# Patient Record
Sex: MALE | Race: WHITE | NOT HISPANIC OR LATINO | Employment: FULL TIME | URBAN - METROPOLITAN AREA
[De-identification: names, ages, dates, MRNs, and addresses within clinical notes are randomized per-mention and may not be internally consistent; named-entity substitution may affect disease eponyms.]

---

## 2023-08-24 PROBLEM — E11.65 HYPERGLYCEMIA DUE TO TYPE 2 DIABETES MELLITUS (MULTI): Status: ACTIVE | Noted: 2023-08-24

## 2023-08-24 PROBLEM — N52.9 INABILITY TO ATTAIN ERECTION: Status: ACTIVE | Noted: 2023-08-24

## 2023-08-24 PROBLEM — M67.02 ACQUIRED SHORT ACHILLES TENDON OF LEFT LOWER EXTREMITY: Status: ACTIVE | Noted: 2023-08-24

## 2023-08-24 PROBLEM — E11.9 DIABETES MELLITUS (MULTI): Status: ACTIVE | Noted: 2023-08-24

## 2023-08-24 PROBLEM — M72.2 PLANTAR FASCIITIS: Status: ACTIVE | Noted: 2023-08-24

## 2023-08-24 PROBLEM — H00.031 CELLULITIS OF RIGHT UPPER EYELID: Status: ACTIVE | Noted: 2023-08-24

## 2023-08-24 PROBLEM — L03.213 PRESEPTAL CELLULITIS OF RIGHT UPPER EYELID: Status: ACTIVE | Noted: 2023-08-24

## 2023-08-24 PROBLEM — R03.0 ELEVATED BLOOD PRESSURE READING WITHOUT DIAGNOSIS OF HYPERTENSION: Status: ACTIVE | Noted: 2023-08-24

## 2023-08-24 PROBLEM — N52.1 ERECTILE DYSFUNCTION DUE TO DISEASES CLASSIFIED ELSEWHERE: Status: ACTIVE | Noted: 2023-08-24

## 2023-08-24 PROBLEM — E66.9 OBESITY: Status: ACTIVE | Noted: 2023-08-24

## 2023-08-24 PROBLEM — E78.2 MIXED HYPERLIPIDEMIA: Status: ACTIVE | Noted: 2023-08-24

## 2023-08-24 PROBLEM — E55.9 VITAMIN D DEFICIENCY: Status: ACTIVE | Noted: 2023-08-24

## 2023-08-24 RX ORDER — INSULIN ASPART 100 [IU]/ML
2-6 INJECTION, SOLUTION INTRAVENOUS; SUBCUTANEOUS 3 TIMES DAILY
COMMUNITY
Start: 2019-02-13 | End: 2023-10-20 | Stop reason: SDUPTHER

## 2023-08-24 RX ORDER — CHOLECALCIFEROL (VITAMIN D3) 50 MCG
2000 TABLET ORAL DAILY
COMMUNITY
End: 2023-10-20 | Stop reason: SDUPTHER

## 2023-08-24 RX ORDER — METFORMIN HYDROCHLORIDE 1000 MG/1
1000 TABLET ORAL
COMMUNITY
Start: 2022-04-01 | End: 2023-10-20 | Stop reason: SDUPTHER

## 2023-08-24 RX ORDER — INSULIN DEGLUDEC 100 U/ML
34 INJECTION, SOLUTION SUBCUTANEOUS DAILY
COMMUNITY
End: 2023-10-20 | Stop reason: SDUPTHER

## 2023-08-24 RX ORDER — CLOTRIMAZOLE AND BETAMETHASONE DIPROPIONATE 10; .64 MG/G; MG/G
1 CREAM TOPICAL 2 TIMES DAILY
COMMUNITY
Start: 2013-03-21 | End: 2023-10-20 | Stop reason: SDUPTHER

## 2023-08-24 RX ORDER — INSULIN DEGLUDEC 200 U/ML
34 INJECTION, SOLUTION SUBCUTANEOUS EVERY MORNING
COMMUNITY
Start: 2019-02-11 | End: 2023-10-20 | Stop reason: SDUPTHER

## 2023-10-20 ENCOUNTER — OFFICE VISIT (OUTPATIENT)
Dept: PRIMARY CARE | Facility: CLINIC | Age: 58
End: 2023-10-20
Payer: COMMERCIAL

## 2023-10-20 VITALS
SYSTOLIC BLOOD PRESSURE: 138 MMHG | TEMPERATURE: 98 F | BODY MASS INDEX: 31.75 KG/M2 | OXYGEN SATURATION: 98 % | WEIGHT: 215 LBS | HEART RATE: 73 BPM | DIASTOLIC BLOOD PRESSURE: 80 MMHG

## 2023-10-20 DIAGNOSIS — Z00.00 ANNUAL PHYSICAL EXAM: Primary | ICD-10-CM

## 2023-10-20 DIAGNOSIS — Z01.89 ENCOUNTER FOR ROUTINE LABORATORY TESTING: ICD-10-CM

## 2023-10-20 DIAGNOSIS — Z12.5 ENCOUNTER FOR PROSTATE CANCER SCREENING: ICD-10-CM

## 2023-10-20 DIAGNOSIS — E78.2 MIXED HYPERLIPIDEMIA: ICD-10-CM

## 2023-10-20 DIAGNOSIS — Z23 ENCOUNTER FOR IMMUNIZATION: ICD-10-CM

## 2023-10-20 DIAGNOSIS — Z12.12 ENCOUNTER FOR COLORECTAL CANCER SCREENING: ICD-10-CM

## 2023-10-20 DIAGNOSIS — Z79.4 TYPE 2 DIABETES MELLITUS WITH OTHER SPECIFIED COMPLICATION, WITH LONG-TERM CURRENT USE OF INSULIN (MULTI): ICD-10-CM

## 2023-10-20 DIAGNOSIS — E11.69 TYPE 2 DIABETES MELLITUS WITH OTHER SPECIFIED COMPLICATION, WITH LONG-TERM CURRENT USE OF INSULIN (MULTI): ICD-10-CM

## 2023-10-20 DIAGNOSIS — E55.9 VITAMIN D DEFICIENCY: ICD-10-CM

## 2023-10-20 DIAGNOSIS — Z12.11 ENCOUNTER FOR COLORECTAL CANCER SCREENING: ICD-10-CM

## 2023-10-20 DIAGNOSIS — M15.9 PRIMARY OSTEOARTHRITIS INVOLVING MULTIPLE JOINTS: ICD-10-CM

## 2023-10-20 PROBLEM — M15.0 PRIMARY OSTEOARTHRITIS INVOLVING MULTIPLE JOINTS: Status: ACTIVE | Noted: 2023-10-20

## 2023-10-20 PROBLEM — E11.65 HYPERGLYCEMIA DUE TO TYPE 2 DIABETES MELLITUS (MULTI): Status: RESOLVED | Noted: 2023-08-24 | Resolved: 2023-10-20

## 2023-10-20 PROBLEM — L03.213 PRESEPTAL CELLULITIS OF RIGHT UPPER EYELID: Status: RESOLVED | Noted: 2023-08-24 | Resolved: 2023-10-20

## 2023-10-20 PROCEDURE — 3075F SYST BP GE 130 - 139MM HG: CPT | Performed by: STUDENT IN AN ORGANIZED HEALTH CARE EDUCATION/TRAINING PROGRAM

## 2023-10-20 PROCEDURE — 99396 PREV VISIT EST AGE 40-64: CPT | Performed by: STUDENT IN AN ORGANIZED HEALTH CARE EDUCATION/TRAINING PROGRAM

## 2023-10-20 PROCEDURE — 1036F TOBACCO NON-USER: CPT | Performed by: STUDENT IN AN ORGANIZED HEALTH CARE EDUCATION/TRAINING PROGRAM

## 2023-10-20 PROCEDURE — 99213 OFFICE O/P EST LOW 20 MIN: CPT | Performed by: STUDENT IN AN ORGANIZED HEALTH CARE EDUCATION/TRAINING PROGRAM

## 2023-10-20 PROCEDURE — 3079F DIAST BP 80-89 MM HG: CPT | Performed by: STUDENT IN AN ORGANIZED HEALTH CARE EDUCATION/TRAINING PROGRAM

## 2023-10-20 RX ORDER — CHOLECALCIFEROL (VITAMIN D3) 50 MCG
2000 TABLET ORAL DAILY
Start: 2023-10-20

## 2023-10-20 RX ORDER — INSULIN ASPART 100 [IU]/ML
2-6 INJECTION, SOLUTION INTRAVENOUS; SUBCUTANEOUS
Qty: 10 ML
Start: 2023-10-20 | End: 2024-05-28 | Stop reason: SDUPTHER

## 2023-10-20 RX ORDER — METFORMIN HYDROCHLORIDE 1000 MG/1
1000 TABLET ORAL
Start: 2023-10-20 | End: 2023-11-20

## 2023-10-20 RX ORDER — INSULIN DEGLUDEC 100 U/ML
34 INJECTION, SOLUTION SUBCUTANEOUS DAILY
Start: 2023-10-20 | End: 2024-05-28 | Stop reason: SDUPTHER

## 2023-10-20 RX ORDER — MULTIVITAMIN
TABLET ORAL
Start: 2023-10-20

## 2023-10-20 RX ORDER — CLOTRIMAZOLE AND BETAMETHASONE DIPROPIONATE 10; .64 MG/G; MG/G
1 CREAM TOPICAL DAILY PRN
Qty: 45 G | Refills: 3 | Status: SHIPPED | OUTPATIENT
Start: 2023-10-20

## 2023-10-20 ASSESSMENT — ENCOUNTER SYMPTOMS
GASTROINTESTINAL NEGATIVE: 1
ALLERGIC/IMMUNOLOGIC NEGATIVE: 1
NEUROLOGICAL NEGATIVE: 1
ENDOCRINE NEGATIVE: 1
CARDIOVASCULAR NEGATIVE: 1
EYES NEGATIVE: 1
MUSCULOSKELETAL NEGATIVE: 1
HEMATOLOGIC/LYMPHATIC NEGATIVE: 1
PSYCHIATRIC NEGATIVE: 1
CONSTITUTIONAL NEGATIVE: 1
RESPIRATORY NEGATIVE: 1

## 2023-10-20 ASSESSMENT — PAIN SCALES - GENERAL: PAINLEVEL: 4

## 2023-10-20 NOTE — PROGRESS NOTES
[facility]: [facility]  PHYSICAL EXAM      Kelvin Engle is a 57 y.o. male that is presenting today for Annual Exam (CPE).    Assessment/Plan   Diagnoses and all orders for this visit:  Annual physical exam  Encounter for routine laboratory testing  Comments:  Patient due for labwork. Ordered today.  Will also order labwork for the patient's next appointment.  Orders:  -     Basic Metabolic Panel; Future  -     Hepatic Function Panel; Future  -     Lipid Panel; Future  -     CBC; Future  -     Vitamin D 25-Hydroxy,Total (for eval of Vitamin D levels); Future  -     Prostate Specific Antigen; Future  -     Basic Metabolic Panel; Future  -     Hepatic Function Panel; Future  -     CBC; Future  -     Hemoglobin A1C; Future  -     Albumin , Urine Random; Future  -     TSH with reflex to Free T4 if abnormal; Future  Encounter for prostate cancer screening  -     Prostate Specific Antigen; Future  Encounter for colorectal cancer screening  Comments:  Patient due. Referred today.  Orders:  -     Referral to Gastroenterology; Future  Encounter for immunization  Comments:  Encouraged shingles (shingrix) immunizations.  Type 2 diabetes mellitus with other specified complication, with long-term current use of insulin (CMS/[facility])  Comments:  Managed by endocrinology. Will not make changes at this time.  Orders:  -     metFORMIN (Glucophage) 1,000 mg tablet; Take 1 tablet (1,000 mg) by mouth 2 times a day with meals.  -     insulin degludec (Tresiba U-100 Insulin) 100 unit/mL injection; Inject 34 Units under the skin once daily.  -     insulin aspart (NovoLOG) 100 unit/mL (3 mL) pen; Inject 2-6 Units under the skin 3 times a day with meals.  -     Basic Metabolic Panel; Future  -     CBC; Future  -     Basic Metabolic Panel; Future  -     CBC; Future  -     Hemoglobin A1C; Future  -     Albumin , Urine Random; Future  -     TSH with reflex to Free T4 if abnormal; Future  Mixed  hyperlipidemia  Comments:  Patient with T2DM. Discussed starting statin to reduce risk of major cardiovascular event. Patient not interested in this at this time.  Orders:  -     Hepatic Function Panel; Future  -     Lipid Panel; Future  -     Hepatic Function Panel; Future  Primary osteoarthritis involving multiple joints  Comments:  New bilateral hip pain. Check XR.  Orders:  -     XR hips bilateral 2 VW w or wo pelvis; Future  Vitamin D deficiency  -     cholecalciferol (Vitamin D-3) 50 MCG (2000 UT) tablet; Take 1 tablet (2,000 Units) by mouth once daily.  -     multivitamin tablet; One-A-Day Men's 50+ Complete Multivitamin. 1 tablet once/day.  -     Vitamin D 25-Hydroxy,Total (for eval of Vitamin D levels); Future    Subjective   - Patient is here today as a follow-up for T2DM.  - Medications: Patient takes all medications as prescribed.  - Sugar Readings: The patient has a Continuous Glucose Monitor (CGM).  - Hypoglycemia: The patient has had occasional hypoglycemic events. Seem to be associated with his recent travels. Have since improved.  - Diet: Patient notes that they are mostly adherent to a low-carb diet.  - Exercise: Patient attempts to exercise multiple days a week.  - Weight: Patient's weight has been stable since last visit.  - The patient otherwise feels well and denies any acute symptoms or concerns at this time.  - The patient denies any changes or progression of their chronic medical problems.  - The patient denies any problems or concerns with their medications.      Review of Systems   Constitutional: Negative.    HENT: Negative.     Eyes: Negative.    Respiratory: Negative.     Cardiovascular: Negative.    Gastrointestinal: Negative.    Endocrine: Negative.    Genitourinary: Negative.    Musculoskeletal: Negative.    Skin: Negative.    Allergic/Immunologic: Negative.    Neurological: Negative.    Hematological: Negative.    Psychiatric/Behavioral: Negative.     All other systems reviewed and  are negative.     Objective   Vitals:    10/20/23 0922   BP: 138/80   Pulse: 73   Temp: 36.7 °C (98 °F)   SpO2: 98%     Body mass index is 31.75 kg/m².  Physical Exam  Vitals and nursing note reviewed.   Constitutional:       General: He is not in acute distress.     Appearance: Normal appearance. He is not ill-appearing.   HENT:      Head: Normocephalic and atraumatic.      Right Ear: Tympanic membrane, ear canal and external ear normal. There is no impacted cerumen.      Left Ear: Tympanic membrane, ear canal and external ear normal. There is no impacted cerumen.      Nose: Nose normal.      Mouth/Throat:      Mouth: Mucous membranes are moist.      Pharynx: Oropharynx is clear. No oropharyngeal exudate or posterior oropharyngeal erythema.   Eyes:      General: No scleral icterus.        Right eye: No discharge.         Left eye: No discharge.      Extraocular Movements: Extraocular movements intact.      Conjunctiva/sclera: Conjunctivae normal.      Pupils: Pupils are equal, round, and reactive to light.   Neck:      Vascular: No carotid bruit.   Cardiovascular:      Rate and Rhythm: Normal rate and regular rhythm.      Pulses: Normal pulses.      Heart sounds: Normal heart sounds. No murmur heard.     No friction rub. No gallop.   Pulmonary:      Effort: Pulmonary effort is normal. No respiratory distress.      Breath sounds: Normal breath sounds.   Abdominal:      General: Abdomen is flat. Bowel sounds are normal.      Palpations: Abdomen is soft.      Tenderness: There is no abdominal tenderness.      Hernia: No hernia is present.   Musculoskeletal:         General: No swelling. Normal range of motion.      Cervical back: Normal range of motion.   Lymphadenopathy:      Cervical: No cervical adenopathy.   Skin:     General: Skin is warm and dry.      Coloration: Skin is not jaundiced.      Findings: No rash.   Neurological:      General: No focal deficit present.      Mental Status: He is alert and oriented to  "person, place, and time. Mental status is at baseline.   Psychiatric:         Mood and Affect: Mood normal.         Behavior: Behavior normal.       Diagnostic Results   Lab Results   Component Value Date    GLUCOSE 214 (H) 10/22/2022    CALCIUM 9.4 10/22/2022     10/22/2022    K 4.6 10/22/2022    CO2 28 10/22/2022    CL 99 10/22/2022    BUN 15 10/22/2022    CREATININE 0.9 10/22/2022     Lab Results   Component Value Date    ALT 17 10/22/2022    AST 15 10/22/2022    ALKPHOS 80 10/22/2022    BILITOT 0.7 10/22/2022     Lab Results   Component Value Date    WBC 6.8 10/22/2022    HGB 15.7 10/22/2022    HCT 45.8 10/22/2022    MCV 87.7 10/22/2022     10/22/2022     Lab Results   Component Value Date    CHOL 215 (H) 10/22/2022    CHOL 187 04/14/2021    CHOL 172 02/22/2020     Lab Results   Component Value Date    HDL 48 10/22/2022    HDL 39.3 (A) 04/14/2021    HDL 40.6 02/22/2020     Lab Results   Component Value Date    LDLCALC 145 (H) 10/22/2022     Lab Results   Component Value Date    TRIG 109 10/22/2022    TRIG 125 04/14/2021    TRIG 113 02/22/2020     No components found for: \"CHOLHDL\"  Lab Results   Component Value Date    HGBA1C 6.9 04/14/2021     Other labs not included in the list above were reviewed either before or during this encounter.    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    Pain in left foot 05/11/2018    Pain of left heel    Pain in throat 08/15/2015    Throat discomfort    Personal history of " diseases of the skin and subcutaneous tissue 12/19/2018    History of drug dermatitis    Personal history of other diseases of the nervous system and sense organs     History of sleep apnea    Personal history of other diseases of the respiratory system 08/06/2014    History of upper respiratory infection    Personal history of other specified conditions 09/23/2016    History of weight gain    Personal history of other specified conditions 09/23/2016    History of fatigue    Personal history of other specified conditions 12/07/2014    History of dysuria    Personal history of other specified conditions 10/28/2013    History of shortness of breath    Personal history of other specified conditions 05/28/2016    History of dizziness    Personal history of other specified conditions     History of snoring    Personal history of urinary (tract) infections 12/19/2013    History of urinary tract infection    Plantar fascial fibromatosis 02/23/2018    Plantar fasciitis, left    Procedure and treatment not carried out because of patient's decision for unspecified reasons 02/08/2018    Statin declined    Procedure and treatment not carried out because of patient's decision for unspecified reasons 03/19/2021    Colonoscopy refused    Spermatocele of epididymis, unspecified 07/24/2016    Spermatocele    Unspecified convulsions (CMS/HCC)     Generalized convulsive seizure     Past Surgical History:   Procedure Laterality Date    TONSILLECTOMY  02/08/2018    Tonsillectomy     Family History   Problem Relation Name Age of Onset    Diabetes Father      Mental illness Father      Bipolar disorder Father      Mental illness Brother       Social History     Socioeconomic History    Marital status:      Spouse name: Not on file    Number of children: Not on file    Years of education: Not on file    Highest education level: Not on file   Occupational History    Not on file   Tobacco Use    Smoking status: Never    Smokeless  tobacco: Never   Substance and Sexual Activity    Alcohol use: Yes     Comment: socially    Drug use: Never    Sexual activity: Not on file   Other Topics Concern    Not on file   Social History Narrative    Not on file     Social Determinants of Health     Financial Resource Strain: Not on file   Food Insecurity: Not on file   Transportation Needs: Not on file   Physical Activity: Not on file   Stress: Not on file   Social Connections: Not on file   Intimate Partner Violence: Not on file   Housing Stability: Not on file     Allergies   Allergen Reactions    Carbamazepine Hives and Unknown    Dapagliflozin Other and Dizziness     Fatigue     Current Outpatient Medications on File Prior to Visit   Medication Sig Dispense Refill    clotrimazole-betamethasone (Lotrisone) cream Apply 1 Application topically twice a day. APPLY AND RUB IN A THIN FILM TO AFFECTED AREAS TWICE DAILY.(AM AND PM)      [DISCONTINUED] cholecalciferol (Vitamin D-3) 50 MCG (2000 UT) tablet Take 1 tablet (2,000 Units) by mouth once daily.      [DISCONTINUED] insulin aspart (NovoLOG) 100 unit/mL (3 mL) pen Inject 2-6 Units under the skin 3 times a day.      [DISCONTINUED] insulin degludec (Tresiba FlexTouch U-200) 200 unit/mL (3 mL) injection Inject 34 Units under the skin once daily in the morning.      [DISCONTINUED] insulin degludec (Tresiba U-100 Insulin) 100 unit/mL injection Inject 34 Units under the skin once daily.      [DISCONTINUED] metFORMIN (Glucophage) 1,000 mg tablet Take 1 tablet (1,000 mg) by mouth 2 times a day with meals.      [DISCONTINUED] VITAMIN B COMPLEX ORAL Take by mouth.       No current facility-administered medications on file prior to visit.     Immunization History   Administered Date(s) Administered    Hepatitis B vaccine, adult (RECOMBIVAX, ENGERIX) 07/09/2015, 09/23/2015    Hepatitis B vaccine, pediatric/adolescent (RECOMBIVAX, ENGERIX) 01/28/2016    MMR vaccine, subcutaneous (MMR II) 01/01/1985    Moderna SARS-CoV-2  Vaccination 03/10/2021    Pfizer Purple Cap SARS-CoV-2 12/02/2021    Tdap vaccine, age 7 year and older (BOOSTRIX) 07/14/2021     Patient's medical history was reviewed and updated either before or during this encounter.    Kelvin Naidu MD

## 2023-10-20 NOTE — PATIENT INSTRUCTIONS
Diagnoses and all orders for this visit:  Annual physical exam  Encounter for routine laboratory testing  Comments:  Patient due for labwork. Ordered today.  Will also order labwork for the patient's next appointment.  Orders:  -     Basic Metabolic Panel; Future  -     Hepatic Function Panel; Future  -     Lipid Panel; Future  -     CBC; Future  -     Vitamin D 25-Hydroxy,Total (for eval of Vitamin D levels); Future  -     Prostate Specific Antigen; Future  -     Basic Metabolic Panel; Future  -     Hepatic Function Panel; Future  -     CBC; Future  -     Hemoglobin A1C; Future  -     Albumin , Urine Random; Future  -     TSH with reflex to Free T4 if abnormal; Future  Encounter for prostate cancer screening  -     Prostate Specific Antigen; Future  Encounter for colorectal cancer screening  Comments:  Patient due. Referred today.  Orders:  -     Referral to Gastroenterology; Future  Encounter for immunization  Comments:  Encouraged shingles (shingrix) immunizations.  Type 2 diabetes mellitus with other specified complication, with long-term current use of insulin (CMS/[facility])  Comments:  Managed by endocrinology. Will not make changes at this time.  Orders:  -     metFORMIN (Glucophage) 1,000 mg tablet; Take 1 tablet (1,000 mg) by mouth 2 times a day with meals.  -     insulin degludec (Tresiba U-100 Insulin) 100 unit/mL injection; Inject 34 Units under the skin once daily.  -     insulin aspart (NovoLOG) 100 unit/mL (3 mL) pen; Inject 2-6 Units under the skin 3 times a day with meals.  -     Basic Metabolic Panel; Future  -     CBC; Future  -     Basic Metabolic Panel; Future  -     CBC; Future  -     Hemoglobin A1C; Future  -     Albumin , Urine Random; Future  -     TSH with reflex to Free T4 if abnormal; Future  Mixed hyperlipidemia  Comments:  Patient with T2DM. Discussed starting statin to reduce risk of major cardiovascular event. Patient not interested in this at this time.  Orders:  -     Hepatic Function  Panel; Future  -     Lipid Panel; Future  -     Hepatic Function Panel; Future  Primary osteoarthritis involving multiple joints  Comments:  New bilateral hip pain. Check XR.  Orders:  -     XR hips bilateral 2 VW w or wo pelvis; Future  Vitamin D deficiency  -     cholecalciferol (Vitamin D-3) 50 MCG (2000 UT) tablet; Take 1 tablet (2,000 Units) by mouth once daily.  -     multivitamin tablet; One-A-Day Men's 50+ Complete Multivitamin. 1 tablet once/day.  -     Vitamin D 25-Hydroxy,Total (for eval of Vitamin D levels); Future

## 2023-11-08 ENCOUNTER — HOSPITAL ENCOUNTER (OUTPATIENT)
Dept: RADIOLOGY | Facility: HOSPITAL | Age: 58
Discharge: HOME | End: 2023-11-08
Payer: COMMERCIAL

## 2023-11-08 DIAGNOSIS — M15.9 PRIMARY OSTEOARTHRITIS INVOLVING MULTIPLE JOINTS: ICD-10-CM

## 2023-11-08 PROCEDURE — 73523 X-RAY EXAM HIPS BI 5/> VIEWS: CPT | Mod: BILATERAL PROCEDURE | Performed by: RADIOLOGY

## 2023-11-08 PROCEDURE — 73521 X-RAY EXAM HIPS BI 2 VIEWS: CPT

## 2023-11-15 ENCOUNTER — OFFICE VISIT (OUTPATIENT)
Dept: PRIMARY CARE | Facility: CLINIC | Age: 58
End: 2023-11-15
Payer: COMMERCIAL

## 2023-11-15 VITALS
HEART RATE: 79 BPM | DIASTOLIC BLOOD PRESSURE: 86 MMHG | WEIGHT: 215.8 LBS | BODY MASS INDEX: 31.96 KG/M2 | OXYGEN SATURATION: 98 % | HEIGHT: 69 IN | TEMPERATURE: 97.5 F | SYSTOLIC BLOOD PRESSURE: 136 MMHG

## 2023-11-15 DIAGNOSIS — M25.552 BILATERAL HIP PAIN: ICD-10-CM

## 2023-11-15 DIAGNOSIS — M15.9 PRIMARY OSTEOARTHRITIS INVOLVING MULTIPLE JOINTS: Primary | ICD-10-CM

## 2023-11-15 DIAGNOSIS — M25.551 BILATERAL HIP PAIN: ICD-10-CM

## 2023-11-15 PROCEDURE — 3079F DIAST BP 80-89 MM HG: CPT | Performed by: FAMILY MEDICINE

## 2023-11-15 PROCEDURE — 1036F TOBACCO NON-USER: CPT | Performed by: FAMILY MEDICINE

## 2023-11-15 PROCEDURE — 99214 OFFICE O/P EST MOD 30 MIN: CPT | Performed by: FAMILY MEDICINE

## 2023-11-15 PROCEDURE — 3075F SYST BP GE 130 - 139MM HG: CPT | Performed by: FAMILY MEDICINE

## 2023-11-15 RX ORDER — METHYLPREDNISOLONE 4 MG/1
TABLET ORAL
Qty: 21 TABLET | Refills: 0 | Status: SHIPPED | OUTPATIENT
Start: 2023-11-15 | End: 2023-11-22

## 2023-11-15 RX ORDER — CYCLOBENZAPRINE HCL 5 MG
5 TABLET ORAL 3 TIMES DAILY PRN
Qty: 15 TABLET | Refills: 0 | Status: SHIPPED | OUTPATIENT
Start: 2023-11-15 | End: 2023-11-30

## 2023-11-15 RX ORDER — MELOXICAM 15 MG/1
7.5 TABLET ORAL DAILY
Qty: 30 TABLET | Refills: 1 | Status: SHIPPED | OUTPATIENT
Start: 2023-11-15 | End: 2023-12-12 | Stop reason: SDUPTHER

## 2023-11-15 ASSESSMENT — PAIN SCALES - GENERAL: PAINLEVEL: 6

## 2023-11-15 NOTE — PROGRESS NOTES
Outpatient Visit Note    Chief Complaint   Patient presents with    Pain     Hip pain. Attempted to call PCP office about hip pain from last night 6-8/10 pain. Pt saw results of xray on MyChart but has not heard from office about results and any further evaluation.    Tried motrin last night with temporary relief         HPI:  Kelvin Engle is a 57 y.o. male   who presents to the office secondary to persistent bilateral left hip pain.      He is an established patient of Dr. Kelvin Naidu, having recently been seen for physical exam in October 2023.  At that time he did mention bilateral hip pain to which x-rays were ordered.  Did have x-rays completed on 11/8/2023.  Stated to have reached out to office regarding his test results as he had not heard anything.  Noted pain to be worsening over the last 24 hours so he set up alternative appointment to discuss symptoms and findings.    In review, patient states that bilateral hip pain started gradually over the past several weeks following recent travel.  Does state to have been hiking and biking without specific injury.  Has had aching throbbing gluteal and lateral hip pain which is worse with changing position or when laying down at night.  Typically rates pain as a 6-8/10.  Has primarily been managing with Motrin which does provide slight, temporary pain relief.  Has been having limitations in mobility as well as sleep disruption secondary to pain.  Denies any significant history of prior hip injury.  Does have history of recurrent low back pain which has been slightly aggravated with episode.  Denies any lower extremity radiculopathy, saddle anesthesia or bowel/bladder dysfunction.  Of note, chart review does show patient to be an insulin-dependent diabetic    Under the care of Dr. Ferrell of endocrinology.  Was last seen in August at which time A1c showed good control at 6.6%.  Does have a continuous glucose monitor.    Lastly chart notes reported allergy  to carbamazepine, stating to have had hives many years ago.  Patient has utilized alternative muscle relaxants in the past without similar complaints.    Current Medications  Current Outpatient Medications   Medication Instructions    cholecalciferol (VITAMIN D-3) 2,000 Units, oral, Daily    clotrimazole-betamethasone (Lotrisone) cream 1 Application, Topical, Daily PRN    insulin aspart (NOVOLOG) 2-6 Units, subcutaneous, 3 times daily with meals    insulin degludec (TRESIBA U-100 INSULIN) 34 Units, subcutaneous, Daily    metFORMIN (GLUCOPHAGE) 1,000 mg, oral, 2 times daily with meals    multivitamin tablet One-A-Day Men's 50+ Complete Multivitamin. 1 tablet once/day.        Allergies  Allergies   Allergen Reactions    Carbamazepine Hives and Unknown    Dapagliflozin Other and Dizziness     Fatigue        Past Medical History:   Diagnosis Date    Arthralgia of temporomandibular joint, unspecified side 08/15/2015    TMJ arthralgia    Candidal stomatitis 11/07/2018    Thrush    Disorder of the skin and subcutaneous tissue, unspecified 07/12/2016    Skin lesion    Encounter for screening for malignant neoplasm of prostate 11/09/2015    Encounter for screening for malignant neoplasm of prostate    Hyperlipidemia, unspecified 03/19/2021    Hyperlipidemia    Leukoplakia of oral mucosa, 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    Pain in left foot 05/11/2018    Pain of left heel    Pain in throat 08/15/2015    Throat discomfort    Personal history of diseases of the skin and subcutaneous tissue 12/19/2018    History of drug dermatitis    Personal history of other diseases of the nervous system and sense organs     History of sleep apnea    Personal history of other diseases of the respiratory system 08/06/2014    History of upper respiratory infection    Personal history of other specified conditions 09/23/2016    History of weight gain    Personal history of other specified conditions 09/23/2016    History of  fatigue    Personal history of other specified conditions 12/07/2014    History of dysuria    Personal history of other specified conditions 10/28/2013    History of shortness of breath    Personal history of other specified conditions 05/28/2016    History of dizziness    Personal history of other specified conditions     History of snoring    Personal history of urinary (tract) infections 12/19/2013    History of urinary tract infection    Plantar fascial fibromatosis 02/23/2018    Plantar fasciitis, left    Procedure and treatment not carried out because of patient's decision for unspecified reasons 02/08/2018    Statin declined    Procedure and treatment not carried out because of patient's decision for unspecified reasons 03/19/2021    Colonoscopy refused    Spermatocele of epididymis, unspecified 07/24/2016    Spermatocele    Unspecified convulsions (CMS/HCC)     Generalized convulsive seizure      Past Surgical History:   Procedure Laterality Date    TONSILLECTOMY  02/08/2018    Tonsillectomy     Family History   Problem Relation Name Age of Onset    Diabetes Father      Mental illness Father      Bipolar disorder Father      Mental illness Brother       Social History     Tobacco Use    Smoking status: Never    Smokeless tobacco: Never   Vaping Use    Vaping Use: Never used   Substance Use Topics    Alcohol use: Yes     Alcohol/week: 7.0 - 14.0 standard drinks of alcohol     Types: 7 - 14 Glasses of wine per week    Drug use: Never       ROS  All pertinent positive symptoms are included in the history of present illness.  All other systems have been reviewed and are negative and noncontributory to this patient's current ailments.    VITAL SIGNS  Vitals:    11/15/23 1118   BP: 136/86   Pulse: 79   Temp: 36.4 °C (97.5 °F)   SpO2: 98%       PHYSICAL EXAM  GENERAL APPEARANCE: alert and oriented, Pleasant and cooperative, No Acute Distress  HEENT: EOMI, PERRLA, MMM  EXTREMITIES: Mildly reduced hip flexion,  negative straight leg, raise no edema  SKIN: normal, no rash, unremarkable  NEUROLOGIC EXAM: non-focal exam  MUSCULOSKELETAL: Minimal lumbar paraspinal TTP  PSYCH: affect is normal, eye contact is good      Assessment/Plan   Problem List Items Addressed This Visit             ICD-10-CM    Primary osteoarthritis involving multiple joints - Primary M15.9     - As you do have osteoarthritic degenerative changes with occasional pain, did additionally recommend trial of daily anti-inflammatory in the form of meloxicam which can be initiated after completing steroid course         Relevant Medications    methylPREDNISolone (Medrol Dospak) 4 mg tablets    cyclobenzaprine (Flexeril) 5 mg tablet    meloxicam (Mobic) 15 mg tablet    Bilateral hip pain M25.551, M25.552     - X-rays reviewed which shows mild degenerative changes in bilateral hips  - symptoms seem consistent with hip flexor/gluteal muscular strain with associated spasms  - supportive care advocated in the form of rest, gentle stretching, ice/heat and anti-inflammatory therapy  - will attempt to optimize anti-inflammatory therapy utilizing a structured steroid pack; please take with food as directed  - while on steroid pack, please avoid additional over-the-counter anti-inflammatory such as ibuprofen/Advil/Aleve/Motrin as too much anti-inflammatory medication can irritate stomach  - if additional pain relief is needed, it is safe to take acetaminophen/Tylenol 500-1000 mg every 6 hours as needed with food  -We will additionally send as needed muscle relaxant that can be used sparingly minding that this can make people sleepy so it should only be used when settled in for evening/nighttime and not when operating machinery or driving  - if symptoms persist, please contact office and we can coordinate for further evaluation including possible imaging and/or physical therapy assessment         Relevant Medications    methylPREDNISolone (Medrol Dospak) 4 mg tablets     cyclobenzaprine (Flexeril) 5 mg tablet    meloxicam (Mobic) 15 mg tablet

## 2023-11-15 NOTE — ASSESSMENT & PLAN NOTE
- As you do have osteoarthritic degenerative changes with occasional pain, did additionally recommend trial of daily anti-inflammatory in the form of meloxicam which can be initiated after completing steroid course

## 2023-11-15 NOTE — PATIENT INSTRUCTIONS
Problem List Items Addressed This Visit             ICD-10-CM    Primary osteoarthritis involving multiple joints - Primary M15.9     - As you do have osteoarthritic degenerative changes with occasional pain, did additionally recommend trial of daily anti-inflammatory in the form of meloxicam which can be initiated after completing steroid course         Relevant Medications    methylPREDNISolone (Medrol Dospak) 4 mg tablets    cyclobenzaprine (Flexeril) 5 mg tablet    meloxicam (Mobic) 15 mg tablet    Bilateral hip pain M25.551, M25.552     - X-rays reviewed which shows mild degenerative changes in bilateral hips  - symptoms seem consistent with hip flexor/gluteal muscular strain with associated spasms  - supportive care advocated in the form of rest, gentle stretching, ice/heat and anti-inflammatory therapy  - will attempt to optimize anti-inflammatory therapy utilizing a structured steroid pack; please take with food as directed  - while on steroid pack, please avoid additional over-the-counter anti-inflammatory such as ibuprofen/Advil/Aleve/Motrin as too much anti-inflammatory medication can irritate stomach  - if additional pain relief is needed, it is safe to take acetaminophen/Tylenol 500-1000 mg every 6 hours as needed with food  -We will additionally send as needed muscle relaxant that can be used sparingly minding that this can make people sleepy so it should only be used when settled in for evening/nighttime and not when operating machinery or driving  - if symptoms persist, please contact office and we can coordinate for further evaluation including possible imaging and/or physical therapy assessment         Relevant Medications    methylPREDNISolone (Medrol Dospak) 4 mg tablets    cyclobenzaprine (Flexeril) 5 mg tablet    meloxicam (Mobic) 15 mg tablet

## 2023-11-15 NOTE — ASSESSMENT & PLAN NOTE
- X-rays reviewed which shows mild degenerative changes in bilateral hips  - symptoms seem consistent with hip flexor/gluteal muscular strain with associated spasms  - supportive care advocated in the form of rest, gentle stretching, ice/heat and anti-inflammatory therapy  - will attempt to optimize anti-inflammatory therapy utilizing a structured steroid pack; please take with food as directed  - while on steroid pack, please avoid additional over-the-counter anti-inflammatory such as ibuprofen/Advil/Aleve/Motrin as too much anti-inflammatory medication can irritate stomach  - if additional pain relief is needed, it is safe to take acetaminophen/Tylenol 500-1000 mg every 6 hours as needed with food  -We will additionally send as needed muscle relaxant that can be used sparingly minding that this can make people sleepy so it should only be used when settled in for evening/nighttime and not when operating machinery or driving  - if symptoms persist, please contact office and we can coordinate for further evaluation including possible imaging and/or physical therapy assessment

## 2023-11-20 DIAGNOSIS — E11.69 TYPE 2 DIABETES MELLITUS WITH OTHER SPECIFIED COMPLICATION, WITH LONG-TERM CURRENT USE OF INSULIN (MULTI): ICD-10-CM

## 2023-11-20 DIAGNOSIS — Z79.4 TYPE 2 DIABETES MELLITUS WITH OTHER SPECIFIED COMPLICATION, WITH LONG-TERM CURRENT USE OF INSULIN (MULTI): ICD-10-CM

## 2023-11-20 RX ORDER — METFORMIN HYDROCHLORIDE 1000 MG/1
TABLET ORAL
Qty: 180 TABLET | Refills: 1 | Status: SHIPPED | OUTPATIENT
Start: 2023-11-20 | End: 2024-04-29

## 2023-12-09 ENCOUNTER — LAB (OUTPATIENT)
Dept: LAB | Facility: LAB | Age: 58
End: 2023-12-09
Payer: COMMERCIAL

## 2023-12-09 DIAGNOSIS — Z12.5 ENCOUNTER FOR PROSTATE CANCER SCREENING: ICD-10-CM

## 2023-12-09 DIAGNOSIS — M25.50 POLYARTHRALGIA: ICD-10-CM

## 2023-12-09 DIAGNOSIS — E11.69 TYPE 2 DIABETES MELLITUS WITH OTHER SPECIFIED COMPLICATION, WITH LONG-TERM CURRENT USE OF INSULIN (MULTI): ICD-10-CM

## 2023-12-09 DIAGNOSIS — E55.9 VITAMIN D DEFICIENCY: ICD-10-CM

## 2023-12-09 DIAGNOSIS — Z79.4 TYPE 2 DIABETES MELLITUS WITH OTHER SPECIFIED COMPLICATION, WITH LONG-TERM CURRENT USE OF INSULIN (MULTI): ICD-10-CM

## 2023-12-09 DIAGNOSIS — Z01.89 ENCOUNTER FOR ROUTINE LABORATORY TESTING: ICD-10-CM

## 2023-12-09 DIAGNOSIS — E78.2 MIXED HYPERLIPIDEMIA: ICD-10-CM

## 2023-12-09 LAB
25(OH)D3 SERPL-MCNC: 41 NG/ML (ref 30–100)
ALBUMIN SERPL BCP-MCNC: 4.1 G/DL (ref 3.4–5)
ALP SERPL-CCNC: 92 U/L (ref 33–120)
ALT SERPL W P-5'-P-CCNC: 14 U/L (ref 10–52)
ANION GAP SERPL CALC-SCNC: 14 MMOL/L (ref 10–20)
AST SERPL W P-5'-P-CCNC: 12 U/L (ref 9–39)
BILIRUB DIRECT SERPL-MCNC: 0.1 MG/DL (ref 0–0.3)
BILIRUB SERPL-MCNC: 0.7 MG/DL (ref 0–1.2)
BUN SERPL-MCNC: 17 MG/DL (ref 6–23)
CALCIUM SERPL-MCNC: 9.6 MG/DL (ref 8.6–10.6)
CHLORIDE SERPL-SCNC: 101 MMOL/L (ref 98–107)
CHOLEST SERPL-MCNC: 216 MG/DL (ref 0–199)
CHOLESTEROL/HDL RATIO: 4.9
CO2 SERPL-SCNC: 28 MMOL/L (ref 21–32)
CREAT SERPL-MCNC: 0.85 MG/DL (ref 0.5–1.3)
CREAT UR-MCNC: 78.6 MG/DL (ref 20–370)
ERYTHROCYTE [DISTWIDTH] IN BLOOD BY AUTOMATED COUNT: 12.4 % (ref 11.5–14.5)
GFR SERPL CREATININE-BSD FRML MDRD: >90 ML/MIN/1.73M*2
GLUCOSE SERPL-MCNC: 193 MG/DL (ref 74–99)
HCT VFR BLD AUTO: 42 % (ref 41–52)
HDLC SERPL-MCNC: 44 MG/DL
HGB BLD-MCNC: 14.1 G/DL (ref 13.5–17.5)
LDLC SERPL CALC-MCNC: 150 MG/DL
MCH RBC QN AUTO: 29.4 PG (ref 26–34)
MCHC RBC AUTO-ENTMCNC: 33.6 G/DL (ref 32–36)
MCV RBC AUTO: 88 FL (ref 80–100)
MICROALBUMIN UR-MCNC: <7 MG/L
MICROALBUMIN/CREAT UR: NORMAL MG/G{CREAT}
NON HDL CHOLESTEROL: 172 MG/DL (ref 0–149)
NRBC BLD-RTO: 0 /100 WBCS (ref 0–0)
PLATELET # BLD AUTO: 295 X10*3/UL (ref 150–450)
POTASSIUM SERPL-SCNC: 4.8 MMOL/L (ref 3.5–5.3)
PROT SERPL-MCNC: 7 G/DL (ref 6.4–8.2)
PSA SERPL-MCNC: 0.86 NG/ML
RBC # BLD AUTO: 4.8 X10*6/UL (ref 4.5–5.9)
SODIUM SERPL-SCNC: 138 MMOL/L (ref 136–145)
TRIGL SERPL-MCNC: 111 MG/DL (ref 0–149)
TSH SERPL-ACNC: 2.67 MIU/L (ref 0.44–3.98)
VLDL: 22 MG/DL (ref 0–40)
WBC # BLD AUTO: 11.5 X10*3/UL (ref 4.4–11.3)

## 2023-12-09 PROCEDURE — 36415 COLL VENOUS BLD VENIPUNCTURE: CPT

## 2023-12-09 PROCEDURE — 84153 ASSAY OF PSA TOTAL: CPT

## 2023-12-09 PROCEDURE — 80061 LIPID PANEL: CPT

## 2023-12-09 PROCEDURE — 82248 BILIRUBIN DIRECT: CPT

## 2023-12-09 PROCEDURE — 82570 ASSAY OF URINE CREATININE: CPT

## 2023-12-09 PROCEDURE — 82043 UR ALBUMIN QUANTITATIVE: CPT

## 2023-12-09 PROCEDURE — 80053 COMPREHEN METABOLIC PANEL: CPT

## 2023-12-09 PROCEDURE — 85027 COMPLETE CBC AUTOMATED: CPT

## 2023-12-09 PROCEDURE — 84443 ASSAY THYROID STIM HORMONE: CPT

## 2023-12-09 PROCEDURE — 82306 VITAMIN D 25 HYDROXY: CPT

## 2023-12-09 PROCEDURE — 86140 C-REACTIVE PROTEIN: CPT

## 2023-12-12 ENCOUNTER — OFFICE VISIT (OUTPATIENT)
Dept: PRIMARY CARE | Facility: CLINIC | Age: 58
End: 2023-12-12
Payer: COMMERCIAL

## 2023-12-12 VITALS
DIASTOLIC BLOOD PRESSURE: 78 MMHG | OXYGEN SATURATION: 97 % | HEART RATE: 93 BPM | HEIGHT: 69 IN | TEMPERATURE: 97.2 F | WEIGHT: 214.6 LBS | BODY MASS INDEX: 31.78 KG/M2 | SYSTOLIC BLOOD PRESSURE: 124 MMHG

## 2023-12-12 DIAGNOSIS — M25.551 BILATERAL HIP PAIN: ICD-10-CM

## 2023-12-12 DIAGNOSIS — M25.552 BILATERAL HIP PAIN: ICD-10-CM

## 2023-12-12 DIAGNOSIS — M54.2 CERVICALGIA: ICD-10-CM

## 2023-12-12 DIAGNOSIS — M15.9 PRIMARY OSTEOARTHRITIS INVOLVING MULTIPLE JOINTS: ICD-10-CM

## 2023-12-12 DIAGNOSIS — M25.50 POLYARTHRALGIA: ICD-10-CM

## 2023-12-12 DIAGNOSIS — M25.50 POLYARTHRALGIA: Primary | ICD-10-CM

## 2023-12-12 LAB — CRP SERPL-MCNC: 2.42 MG/DL

## 2023-12-12 PROCEDURE — 3050F LDL-C >= 130 MG/DL: CPT | Performed by: FAMILY MEDICINE

## 2023-12-12 PROCEDURE — 3074F SYST BP LT 130 MM HG: CPT | Performed by: FAMILY MEDICINE

## 2023-12-12 PROCEDURE — 1036F TOBACCO NON-USER: CPT | Performed by: FAMILY MEDICINE

## 2023-12-12 PROCEDURE — 3062F POS MACROALBUMINURIA REV: CPT | Performed by: FAMILY MEDICINE

## 2023-12-12 PROCEDURE — 99214 OFFICE O/P EST MOD 30 MIN: CPT | Performed by: FAMILY MEDICINE

## 2023-12-12 PROCEDURE — 3078F DIAST BP <80 MM HG: CPT | Performed by: FAMILY MEDICINE

## 2023-12-12 RX ORDER — MELOXICAM 15 MG/1
15 TABLET ORAL DAILY
Qty: 90 TABLET | Refills: 1 | Status: SHIPPED | OUTPATIENT
Start: 2023-12-12 | End: 2024-04-15 | Stop reason: SINTOL

## 2023-12-12 RX ORDER — MELOXICAM 15 MG/1
15 TABLET ORAL DAILY
Qty: 90 TABLET | Refills: 1 | Status: SHIPPED | OUTPATIENT
Start: 2023-12-12 | End: 2023-12-12 | Stop reason: SDUPTHER

## 2023-12-12 ASSESSMENT — PATIENT HEALTH QUESTIONNAIRE - PHQ9
SUM OF ALL RESPONSES TO PHQ9 QUESTIONS 1 AND 2: 0
1. LITTLE INTEREST OR PLEASURE IN DOING THINGS: NOT AT ALL
2. FEELING DOWN, DEPRESSED OR HOPELESS: NOT AT ALL

## 2023-12-12 ASSESSMENT — PAIN SCALES - GENERAL: PAINLEVEL: 8

## 2023-12-12 ASSESSMENT — LIFESTYLE VARIABLES: TOTAL SCORE: 0

## 2023-12-12 NOTE — ASSESSMENT & PLAN NOTE
- With onset of neck pain, will additionally obtain x-ray to see if there are any contributing degenerative changes

## 2023-12-12 NOTE — PATIENT INSTRUCTIONS
Problem List Items Addressed This Visit             ICD-10-CM    Primary osteoarthritis involving multiple joints M15.9    Relevant Medications    meloxicam (Mobic) 15 mg tablet    Bilateral hip pain M25.551, M25.552    Relevant Medications    meloxicam (Mobic) 15 mg tablet    Polyarthralgia - Primary M25.50     - With worsening joint pain, will plan to continue with daily meloxicam and coordinate formal evaluation with rheumatology to which I have placed referral  -Continue to focus on supportive care measures including rest, activity as tolerated and ice/heat  -Recent panel blood work was generally unremarkable though we will add on inflammatory markers  -If hip symptoms continue to be significant, can keep orthopedic appointment as scheduled as there may be benefits from possible joint injections         Relevant Medications    meloxicam (Mobic) 15 mg tablet    Other Relevant Orders    XR cervical spine 2-3 views    Referral to Rheumatology    Cervicalgia M54.2     - With onset of neck pain, will additionally obtain x-ray to see if there are any contributing degenerative changes         Relevant Medications    meloxicam (Mobic) 15 mg tablet    Other Relevant Orders    XR cervical spine 2-3 views    Referral to Rheumatology

## 2023-12-12 NOTE — PROGRESS NOTES
Outpatient Visit Note    Chief Complaint   Patient presents with    Pain     Persistant back, hips, leg, shoulders pain. Taking motrin and meloxicam with temporary relief. States he thinks he was taking meloxicam wrong and taking a full pill instead of half. Tried to get a refill but insurance will not pay for one d/t being early         HPI:  Kelvin Engle is a 57 y.o. male   who presents to the office secondary to persistent bilateral left hip pain.      He is an established patient of Dr. Kelvin Naidu, having recently been seen for physical exam in October 2023.  At that time he did mention bilateral hip pain to which x-rays were ordered.  Did have x-rays completed on 11/8/2023.  Stated to have reached out to office regarding his test results as he had not heard anything.  Noted pain to be worsening over the last 24 hours so he set up alternative appointment to discuss symptoms and findings.    In review, patient states that bilateral hip pain started gradually over the past several weeks following recent travel.  Does state to have been hiking and biking without specific injury.  Has had aching throbbing gluteal and lateral hip pain which is worse with changing position or when laying down at night.  Typically rates pain as a 6-8/10.  Has primarily been managing with Motrin which does provide slight, temporary pain relief.  Has been having limitations in mobility as well as sleep disruption secondary to pain.  Denies any significant history of prior hip injury.  Does have history of recurrent low back pain which has been slightly aggravated with episode.  Denies any lower extremity radiculopathy, saddle anesthesia or bowel/bladder dysfunction.  Of note, chart review does show patient to be an insulin-dependent diabetic    Under the care of Dr. Ferrell of endocrinology.  Was last seen in August at which time A1c showed good control at 6.6%.  Does have a continuous glucose monitor.    Lastly chart notes  reported allergy to carbamazepine, stating to have had hives many years ago.  Patient has utilized alternative muscle relaxants in the past without similar complaints.  Patient was ultimately placed on a short course steroid pack with plans to transition to meloxicam 15 mg daily along with as needed Flexeril.    He reports initial improvement of symptoms following steroid pack.  Transition to meloxicam which did seem to help though pain was relieved with taking full tablet and he did go through his prescription quickly with pharmacy not allowing him to refill.  Has any interval been utilizing Motrin 800 which provides some temporary relief.  Has continued to have prominent bilateral hip and thigh pain though pain has extended up back into neck/shoulder region as well.  Pain typically causes sleep disruption.  Denies any GI upset from NSAIDs.    Current Medications  Current Outpatient Medications   Medication Instructions    cholecalciferol (VITAMIN D-3) 2,000 Units, oral, Daily    clotrimazole-betamethasone (Lotrisone) cream 1 Application, Topical, Daily PRN    insulin aspart (NOVOLOG) 2-6 Units, subcutaneous, 3 times daily with meals    insulin degludec (TRESIBA U-100 INSULIN) 34 Units, subcutaneous, Daily    meloxicam (MOBIC) 15 mg, oral, Daily    metFORMIN (Glucophage) 1,000 mg tablet TAKE 1 TABLET BY MOUTH TWICE DAILY WITH A MEAL    multivitamin tablet One-A-Day Men's 50+ Complete Multivitamin. 1 tablet once/day.        Allergies  Allergies   Allergen Reactions    Carbamazepine Hives and Unknown    Dapagliflozin Other and Dizziness     Fatigue        Past Medical History:   Diagnosis Date    Arthralgia of temporomandibular joint, unspecified side 08/15/2015    TMJ arthralgia    Candidal stomatitis 11/07/2018    Thrush    Disorder of the skin and subcutaneous tissue, unspecified 07/12/2016    Skin lesion    Encounter for screening for malignant neoplasm of prostate 11/09/2015    Encounter for screening for  malignant neoplasm of prostate    Hyperlipidemia, unspecified 03/19/2021    Hyperlipidemia    Leukoplakia of oral mucosa, 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    Pain in left foot 05/11/2018    Pain of left heel    Pain in throat 08/15/2015    Throat discomfort    Personal history of diseases of the skin and subcutaneous tissue 12/19/2018    History of drug dermatitis    Personal history of other diseases of the nervous system and sense organs     History of sleep apnea    Personal history of other diseases of the respiratory system 08/06/2014    History of upper respiratory infection    Personal history of other specified conditions 09/23/2016    History of weight gain    Personal history of other specified conditions 09/23/2016    History of fatigue    Personal history of other specified conditions 12/07/2014    History of dysuria    Personal history of other specified conditions 10/28/2013    History of shortness of breath    Personal history of other specified conditions 05/28/2016    History of dizziness    Personal history of other specified conditions     History of snoring    Personal history of urinary (tract) infections 12/19/2013    History of urinary tract infection    Plantar fascial fibromatosis 02/23/2018    Plantar fasciitis, left    Procedure and treatment not carried out because of patient's decision for unspecified reasons 02/08/2018    Statin declined    Procedure and treatment not carried out because of patient's decision for unspecified reasons 03/19/2021    Colonoscopy refused    Spermatocele of epididymis, unspecified 07/24/2016    Spermatocele    Unspecified convulsions (CMS/HCC)     Generalized convulsive seizure      Past Surgical History:   Procedure Laterality Date    TONSILLECTOMY  02/08/2018    Tonsillectomy     Family History   Problem Relation Name Age of Onset    Diabetes Father      Mental illness Father      Bipolar disorder Father      Mental illness Brother        Social History     Tobacco Use    Smoking status: Never    Smokeless tobacco: Never   Vaping Use    Vaping Use: Never used   Substance Use Topics    Alcohol use: Yes     Alcohol/week: 7.0 - 14.0 standard drinks of alcohol     Types: 7 - 14 Glasses of wine per week    Drug use: Never       ROS  All pertinent positive symptoms are included in the history of present illness.  All other systems have been reviewed and are negative and noncontributory to this patient's current ailments.    VITAL SIGNS  Vitals:    12/12/23 1411   BP: 124/78   Pulse: 93   Temp: 36.2 °C (97.2 °F)   SpO2: 97%         PHYSICAL EXAM  GENERAL APPEARANCE: alert and oriented, Pleasant and cooperative, No Acute Distress  HEENT: EOMI, PERRLA, MMM  NECK: Posterior paraspinal TTP with grossly intact cervical range of motion  EXTREMITIES: Mildly reduced hip flexion, negative straight leg, raise no edema  SKIN: normal, no rash, unremarkable  NEUROLOGIC EXAM: non-focal exam  MUSCULOSKELETAL: Minimal lumbar paraspinal TTP  PSYCH: affect is normal, eye contact is good      Assessment/Plan   Problem List Items Addressed This Visit             ICD-10-CM    Primary osteoarthritis involving multiple joints M15.9    Relevant Medications    meloxicam (Mobic) 15 mg tablet    Bilateral hip pain M25.551, M25.552    Relevant Medications    meloxicam (Mobic) 15 mg tablet    Polyarthralgia - Primary M25.50     - With worsening joint pain, will plan to continue with daily meloxicam and coordinate formal evaluation with rheumatology to which I have placed referral  -Continue to focus on supportive care measures including rest, activity as tolerated and ice/heat  -Recent panel blood work was generally unremarkable though we will add on inflammatory markers  -If hip symptoms continue to be significant, can keep orthopedic appointment as scheduled as there may be benefits from possible joint injections         Relevant Medications    meloxicam (Mobic) 15 mg tablet     Other Relevant Orders    XR cervical spine 2-3 views    Referral to Rheumatology    Cervicalgia M54.2     - With onset of neck pain, will additionally obtain x-ray to see if there are any contributing degenerative changes         Relevant Medications    meloxicam (Mobic) 15 mg tablet    Other Relevant Orders    XR cervical spine 2-3 views    Referral to Rheumatology

## 2023-12-12 NOTE — ASSESSMENT & PLAN NOTE
- With worsening joint pain, will plan to continue with daily meloxicam and coordinate formal evaluation with rheumatology to which I have placed referral  -Continue to focus on supportive care measures including rest, activity as tolerated and ice/heat  -Recent panel blood work was generally unremarkable though we will add on inflammatory markers  -If hip symptoms continue to be significant, can keep orthopedic appointment as scheduled as there may be benefits from possible joint injections

## 2023-12-14 ENCOUNTER — OFFICE VISIT (OUTPATIENT)
Dept: GASTROENTEROLOGY | Facility: CLINIC | Age: 58
End: 2023-12-14
Payer: COMMERCIAL

## 2023-12-14 VITALS — BODY MASS INDEX: 31.7 KG/M2 | WEIGHT: 214 LBS | HEIGHT: 69 IN

## 2023-12-14 DIAGNOSIS — Z12.11 SPECIAL SCREENING FOR MALIGNANT NEOPLASMS, COLON: ICD-10-CM

## 2023-12-14 DIAGNOSIS — Z12.11 ENCOUNTER FOR COLORECTAL CANCER SCREENING: ICD-10-CM

## 2023-12-14 DIAGNOSIS — Z12.12 ENCOUNTER FOR COLORECTAL CANCER SCREENING: ICD-10-CM

## 2023-12-14 PROCEDURE — 3050F LDL-C >= 130 MG/DL: CPT | Performed by: INTERNAL MEDICINE

## 2023-12-14 PROCEDURE — 3062F POS MACROALBUMINURIA REV: CPT | Performed by: INTERNAL MEDICINE

## 2023-12-14 PROCEDURE — 1036F TOBACCO NON-USER: CPT | Performed by: INTERNAL MEDICINE

## 2023-12-14 RX ORDER — POLYETHYLENE GLYCOL 3350, SODIUM SULFATE ANHYDROUS, SODIUM BICARBONATE, SODIUM CHLORIDE, POTASSIUM CHLORIDE 236; 22.74; 6.74; 5.86; 2.97 G/4L; G/4L; G/4L; G/4L; G/4L
POWDER, FOR SOLUTION ORAL
Qty: 4000 ML | Refills: 0 | Status: SHIPPED | OUTPATIENT
Start: 2023-12-14 | End: 2024-02-20 | Stop reason: WASHOUT

## 2023-12-14 ASSESSMENT — ENCOUNTER SYMPTOMS
MYALGIAS: 1
ARTHRALGIAS: 1

## 2023-12-14 NOTE — PROGRESS NOTES
Subjective     History of Present Illness:   Kelvin Engle is a 58 y.o. male who presents to GI clinic for colon cancer screening.    Family Hx: son ? colitis.    Review of Systems  Review of Systems   Musculoskeletal:  Positive for arthralgias and myalgias.   All other systems reviewed and are negative.      Past Medical History   has a past medical history of Arthralgia of temporomandibular joint, unspecified side (08/15/2015), Candidal stomatitis (11/07/2018), Disorder of the skin and subcutaneous tissue, unspecified (07/12/2016), Encounter for screening for malignant neoplasm of prostate (11/09/2015), Hyperlipidemia, unspecified (03/19/2021), Leukoplakia of oral mucosa, including tongue (11/05/2018), Pain in left foot (05/11/2018), Pain in throat (08/15/2015), Personal history of diseases of the skin and subcutaneous tissue (12/19/2018), Personal history of other diseases of the nervous system and sense organs, Personal history of other diseases of the respiratory system (08/06/2014), Personal history of other specified conditions (09/23/2016), Personal history of other specified conditions (09/23/2016), Personal history of other specified conditions (12/07/2014), Personal history of other specified conditions (10/28/2013), Personal history of other specified conditions (05/28/2016), Personal history of other specified conditions, Personal history of urinary (tract) infections (12/19/2013), Plantar fascial fibromatosis (02/23/2018), Procedure and treatment not carried out because of patient's decision for unspecified reasons (02/08/2018), Procedure and treatment not carried out because of patient's decision for unspecified reasons (03/19/2021), Spermatocele of epididymis, unspecified (07/24/2016), and Unspecified convulsions (CMS/[facility]).     Social History   reports that he has never smoked. He has never used smokeless tobacco. He reports current alcohol use of about 7.0 - 14.0 standard drinks of alcohol per week.  He reports that he does not use drugs.     Family History  family history includes Bipolar disorder in his father; Diabetes in his father; Mental illness in his brother and father.     Allergies  Allergies   Allergen Reactions    Carbamazepine Hives and Unknown    Dapagliflozin Other and Dizziness     Fatigue       Medications  Current Outpatient Medications   Medication Instructions    cholecalciferol (VITAMIN D-3) 2,000 Units, oral, Daily    clotrimazole-betamethasone (Lotrisone) cream 1 Application, Topical, Daily PRN    insulin aspart (NOVOLOG) 2-6 Units, subcutaneous, 3 times daily with meals    insulin degludec (TRESIBA U-100 INSULIN) 34 Units, subcutaneous, Daily    meloxicam (MOBIC) 15 mg, oral, Daily    metFORMIN (Glucophage) 1,000 mg tablet TAKE 1 TABLET BY MOUTH TWICE DAILY WITH A MEAL    multivitamin tablet One-A-Day Men's 50+ Complete Multivitamin. 1 tablet once/day.        Objective   There were no vitals taken for this visit.   Physical Exam  Constitutional:       Appearance: Normal appearance.   HENT:      Mouth/Throat:      Mouth: Mucous membranes are moist.      Pharynx: Oropharynx is clear.   Eyes:      Extraocular Movements: Extraocular movements intact.      Pupils: Pupils are equal, round, and reactive to light.   Cardiovascular:      Rate and Rhythm: Normal rate and regular rhythm.      Heart sounds: No murmur heard.  Pulmonary:      Effort: Pulmonary effort is normal.      Breath sounds: Normal breath sounds.   Abdominal:      General: Abdomen is flat. Bowel sounds are normal.      Palpations: Abdomen is soft. There is no mass.   Skin:     General: Skin is warm and dry.   Neurological:      Mental Status: He is alert.   Psychiatric:         Mood and Affect: Mood normal.         Behavior: Behavior normal.         Thought Content: Thought content normal.         Judgment: Judgment normal.           Results from last 7 days   Lab Units 12/09/23  0839   WBC AUTO x10*3/uL 11.5*   HEMOGLOBIN g/dL  14.1   HEMATOCRIT % 42.0   PLATELETS AUTO x10*3/uL 295     Results from last 7 days   Lab Units 12/09/23  0839   SODIUM mmol/L 138   POTASSIUM mmol/L 4.8   CHLORIDE mmol/L 101   CO2 mmol/L 28   BUN mg/dL 17   CREATININE mg/dL 0.85   CALCIUM mg/dL 9.6   PROTEIN TOTAL g/dL 7.0   BILIRUBIN TOTAL mg/dL 0.7   ALK PHOS U/L 92   ALT U/L 14   AST U/L 12   GLUCOSE mg/dL 193*     Results from last 7 days   Lab Units 12/09/23  0839   CRP mg/dL 2.42*         Assessment/Plan   Kelvin Engle is a 58 y.o. male who presents to GI clinic for colon cancer screening. No family history.    Counseled on diet    Recommend:    Colonoscopy       Jere Mack MD

## 2023-12-19 ENCOUNTER — HOSPITAL ENCOUNTER (OUTPATIENT)
Dept: RADIOLOGY | Facility: HOSPITAL | Age: 58
Discharge: HOME | End: 2023-12-19
Payer: COMMERCIAL

## 2023-12-19 DIAGNOSIS — M25.50 POLYARTHRALGIA: ICD-10-CM

## 2023-12-19 DIAGNOSIS — M54.2 CERVICALGIA: ICD-10-CM

## 2023-12-19 PROCEDURE — 72040 X-RAY EXAM NECK SPINE 2-3 VW: CPT | Mod: FY

## 2024-01-02 ENCOUNTER — OFFICE VISIT (OUTPATIENT)
Dept: RHEUMATOLOGY | Facility: CLINIC | Age: 59
End: 2024-01-02
Payer: COMMERCIAL

## 2024-01-02 VITALS — BODY MASS INDEX: 31.01 KG/M2 | SYSTOLIC BLOOD PRESSURE: 138 MMHG | WEIGHT: 210 LBS | DIASTOLIC BLOOD PRESSURE: 80 MMHG

## 2024-01-02 DIAGNOSIS — L98.9 SKIN ABNORMALITY: ICD-10-CM

## 2024-01-02 DIAGNOSIS — M19.90 OSTEOARTHRITIS, UNSPECIFIED OSTEOARTHRITIS TYPE, UNSPECIFIED SITE: ICD-10-CM

## 2024-01-02 DIAGNOSIS — M25.50 POLYARTHRALGIA: ICD-10-CM

## 2024-01-02 DIAGNOSIS — M35.3 POLYMYALGIA RHEUMATICA SYNDROME (MULTI): Primary | ICD-10-CM

## 2024-01-02 DIAGNOSIS — M85.89 OTHER SPECIFIED DISORDERS OF BONE DENSITY AND STRUCTURE, MULTIPLE SITES: ICD-10-CM

## 2024-01-02 DIAGNOSIS — M54.2 CERVICALGIA: ICD-10-CM

## 2024-01-02 PROCEDURE — 99214 OFFICE O/P EST MOD 30 MIN: CPT | Performed by: INTERNAL MEDICINE

## 2024-01-02 PROCEDURE — 3075F SYST BP GE 130 - 139MM HG: CPT | Performed by: INTERNAL MEDICINE

## 2024-01-02 PROCEDURE — 99204 OFFICE O/P NEW MOD 45 MIN: CPT | Performed by: INTERNAL MEDICINE

## 2024-01-02 PROCEDURE — 3079F DIAST BP 80-89 MM HG: CPT | Performed by: INTERNAL MEDICINE

## 2024-01-02 PROCEDURE — 1036F TOBACCO NON-USER: CPT | Performed by: INTERNAL MEDICINE

## 2024-01-02 RX ORDER — CYCLOBENZAPRINE HCL 10 MG
10 TABLET ORAL 3 TIMES DAILY PRN
COMMUNITY

## 2024-01-02 RX ORDER — PREDNISONE 10 MG/1
10 TABLET ORAL DAILY
Qty: 30 TABLET | Refills: 2 | Status: SHIPPED | OUTPATIENT
Start: 2024-01-02 | End: 2024-04-01 | Stop reason: WASHOUT

## 2024-01-02 NOTE — PATIENT INSTRUCTIONS
You have symptoms that suggest polymyalgia rheumatica (PMR) - a generalized inflammatory condition that can occur in adults over 50.  You also have osteoarthritis (wear and tear arthritis)  Prednisone 10mg daily  You will get a call to schedule a bone density x-ray to screen for osteoporosis  Call with any questions/concerns

## 2024-01-02 NOTE — PROGRESS NOTES
"Ref per Dr. Mckinney for evaluation and treatment of pain all over x 2 1/2 months.  Labs with neg results.  X-ray neck \" Doctor was vague on diagnosis.        HPI- Pt has had pain that started when he was working on his lawnmower in Sept- started as back pain.  Then went to [place] in Oct and did a lot of hiking.  He then came come and developed hip pain - he saw his primary and had x-rays.  Then he develped neck and B shoulder pain.  His hands feel tight.  He has a hard time sleeping d/t pain.  Ibu 800mg helped, meloxicam helped some, but he now isn't sure if it's doing anything now.  A dosepack helped for the 1st few d, but at the lower doses, the sx returned.  AM stiffness 1/2 hr.  His legs feel weak like they \"gave out for a second\" when he 1st gets up.  No fever but has had night sweats occ.  He used to get freq migraines with wine, but this stopped once he started on metformin.  No tongue/jaw maxim, visual changes, sicca, mouth sores, raynauds, rash (but has a nonhealing scabbed area R facial area), SOB, or GI.  Occ CP - hasn't seen anyone about it.  Occ cough.  Occ toe tingling    FH - +arthritis.   No CTD  SH - Brief remote smoking.  EtOH - 3/wk.  No drugs    PE  Gen - NAD  HEENT - good TA pulses, NT, no head/neck bruits  Neck - supple, no LAD  CV - RRR no r/m/g  Lungs - CTA  Abd - +BS  Extr - 2+ DP, PT, and rad pulses.  1+ BLE edema.  RLE varicosities and some overlying skin changes  Skin - sm scabbed area below R temple.    Psych - nl affect  Neuro - unable to elicit B ankle DTRs.  Nl strength  Msk - no synovitis.  B knee crepitus.  Pain with ROM B shoulders    A/P - OA, now with sx that are suggestive of PMR.  CRP 2.42.  Discussed dx and treatment with pt   X-ray hips and neck show mild OA changes  Prednisone 10mg daily - expl risks/benefits/do not stop suddenly  Check screening DEXA  Derm eval  Reeval 8 wks or sooner PRN    "

## 2024-01-05 ENCOUNTER — APPOINTMENT (OUTPATIENT)
Dept: ORTHOPEDIC SURGERY | Facility: HOSPITAL | Age: 59
End: 2024-01-05
Payer: COMMERCIAL

## 2024-01-09 ENCOUNTER — OFFICE VISIT (OUTPATIENT)
Dept: DERMATOLOGY | Facility: CLINIC | Age: 59
End: 2024-01-09
Payer: COMMERCIAL

## 2024-01-09 DIAGNOSIS — L57.0 ACTINIC KERATOSIS: ICD-10-CM

## 2024-01-09 DIAGNOSIS — D18.01 CHERRY ANGIOMA: Primary | ICD-10-CM

## 2024-01-09 PROCEDURE — 88305 TISSUE EXAM BY PATHOLOGIST: CPT | Performed by: DERMATOLOGY

## 2024-01-09 PROCEDURE — 11310 SHAVE SKIN LESION 0.5 CM/<: CPT

## 2024-01-09 PROCEDURE — 99204 OFFICE O/P NEW MOD 45 MIN: CPT

## 2024-01-09 PROCEDURE — 1036F TOBACCO NON-USER: CPT

## 2024-01-09 PROCEDURE — 88305 TISSUE EXAM BY PATHOLOGIST: CPT | Mod: TC,DER

## 2024-01-09 PROCEDURE — 17110 DESTRUCTION B9 LES UP TO 14: CPT

## 2024-01-09 NOTE — PROGRESS NOTES
Subjective   HPI: Kelvin Engle is a 58 y.o. male is a new patient here for evaluation and treatment of nonhealing wound on the right preauricular area present x1 year. Continuously scabs and then falls off then re scabs. No phx or fhx of skin ca. Red spot on the tip of nose present for 20 years. Patients dad and grands both had something similar.Patient sometimes scratches the area and it bleeds.     ROS: No other skin or systemic complaints other than what is documented elsewhere in the note.    ALLERGIES: Carbamazepine and Dapagliflozin    SOCIAL:  reports that he has never smoked. He has never used smokeless tobacco. He reports current alcohol use of about 7.0 - 14.0 standard drinks of alcohol per week. He reports that he does not use drugs.    Objective   Nose  Bright red papule with surrounding erythematous crust      Right Preauricular Area  3 mm nonhealing papule with overlying crust        Assessment/Plan   1. Cherry angioma  Nose    Discussed benign nature. Since the area appears clinically inflamed I recommended hyfrecation. Patient opts for tx today.    2. Actinic keratosis  Right Preauricular Area    Discussed with patient d/t hx of nonhealing x1 year I recommended shave bx to make sure this is not a skin ca. Patient opts for removal. Discussed wound care.    Shave removal - Right Preauricular Area    Specimen 1 - Dermatopathology- DERM LAB  Differential Diagnosis: ak vs scc  Check Margins Yes/No?:  yes  Comments:    Dermpath Lab: Routine Histopathology (formalin-fixed tissue)         FOLLOW UP: prn- I will call pt w/ bx results    The patient was encouraged to contact me with any further questions or concerns.  Zaynab Maier PA-C  1/9/2024

## 2024-01-11 ENCOUNTER — TELEPHONE (OUTPATIENT)
Dept: DERMATOLOGY | Facility: CLINIC | Age: 59
End: 2024-01-11
Payer: COMMERCIAL

## 2024-01-11 LAB
LABORATORY COMMENT REPORT: NORMAL
PATH REPORT.FINAL DX SPEC: NORMAL
PATH REPORT.GROSS SPEC: NORMAL
PATH REPORT.MICROSCOPIC SPEC OTHER STN: NORMAL
PATH REPORT.RELEVANT HX SPEC: NORMAL
PATH REPORT.TOTAL CANCER: NORMAL

## 2024-02-20 ENCOUNTER — OFFICE VISIT (OUTPATIENT)
Dept: ENDOCRINOLOGY | Facility: CLINIC | Age: 59
End: 2024-02-20
Payer: COMMERCIAL

## 2024-02-20 VITALS — BODY MASS INDEX: 31.9 KG/M2 | WEIGHT: 216 LBS

## 2024-02-20 DIAGNOSIS — Z79.4 TYPE 2 DIABETES MELLITUS WITH HYPERGLYCEMIA, WITH LONG-TERM CURRENT USE OF INSULIN (MULTI): Primary | ICD-10-CM

## 2024-02-20 DIAGNOSIS — E11.65 TYPE 2 DIABETES MELLITUS WITH HYPERGLYCEMIA, WITH LONG-TERM CURRENT USE OF INSULIN (MULTI): Primary | ICD-10-CM

## 2024-02-20 LAB — POC HEMOGLOBIN A1C: 8.1 % (ref 4.2–6.5)

## 2024-02-20 PROCEDURE — 99214 OFFICE O/P EST MOD 30 MIN: CPT | Performed by: INTERNAL MEDICINE

## 2024-02-20 PROCEDURE — 83036 HEMOGLOBIN GLYCOSYLATED A1C: CPT | Performed by: INTERNAL MEDICINE

## 2024-02-20 PROCEDURE — 1036F TOBACCO NON-USER: CPT | Performed by: INTERNAL MEDICINE

## 2024-02-20 ASSESSMENT — ENCOUNTER SYMPTOMS
DIFFICULTY URINATING: 0
NAUSEA: 0
CONSTIPATION: 0
FREQUENCY: 0
UNEXPECTED WEIGHT CHANGE: 1
MYALGIAS: 1
SHORTNESS OF BREATH: 0
ENDOCRINE COMMENTS: AS ABOVE
ROS SKIN COMMENTS: AS ABOVE
VOMITING: 0
DIARRHEA: 0
ARTHRALGIAS: 1

## 2024-02-20 NOTE — LETTER
February 20, 2024     Sushil Mckinney DO  [address] [facility]  [place] [address]    Patient: Kelvin Engle   YOB: 1965   Date of Visit: 2/20/2024       Dear Dr. Sushil Mckinney DO:    Thank you for referring Kelvin Engle to me for evaluation. Below are my notes for this consultation.  If you have questions, please do not hesitate to call me. I look forward to following your patient along with you.       Sincerely,     Isaias Mcknight MD      CC: No Recipients  ______________________________________________________________________________________    History Of Present Illness  Klevin Engle is a 58 y.o. male     Duration of type 2 diabetes mellitus:  17 years  Complications:  none    Interval diagnosis of polymyalgia rheumatica  Started on prednisone 10 mg/day    Basal cell carcinoma at right temple, pending excision    Metformin 1000 mg BID  Tresiba 52 units QAM      Novolog at meals, typically 10+ units since starting prednisone     FreeStyle Phu 2, 288 tests per day.   Records reviewed, on file     Last eye exam May 2023     Past Medical History  He has a past medical history of Arthralgia of temporomandibular joint, unspecified side (08/15/2015), Candidal stomatitis (11/07/2018), Disorder of the skin and subcutaneous tissue, unspecified (07/12/2016), Encounter for screening for malignant neoplasm of prostate (11/09/2015), Hyperlipidemia, unspecified (03/19/2021), Leukoplakia of oral mucosa, including tongue (11/05/2018), Pain in left foot (05/11/2018), Pain in throat (08/15/2015), Personal history of diseases of the skin and subcutaneous tissue (12/19/2018), Personal history of other diseases of the nervous system and sense organs, Personal history of other diseases of the respiratory system (08/06/2014), Personal history of other specified conditions (09/23/2016), Personal history of other specified conditions (09/23/2016), Personal history of other specified conditions  (12/07/2014), Personal history of other specified conditions (10/28/2013), Personal history of other specified conditions (05/28/2016), Personal history of other specified conditions, Personal history of urinary (tract) infections (12/19/2013), Plantar fascial fibromatosis (02/23/2018), Procedure and treatment not carried out because of patient's decision for unspecified reasons (02/08/2018), Procedure and treatment not carried out because of patient's decision for unspecified reasons (03/19/2021), Spermatocele of epididymis, unspecified (07/24/2016), and Unspecified convulsions (CMS/[facility]).    Surgical History  He has a past surgical history that includes Tonsillectomy (02/08/2018).     Social History  He reports that he has never smoked. He has never used smokeless tobacco. He reports current alcohol use of about 7.0 - 14.0 standard drinks of alcohol per week. He reports that he does not use drugs.    Family History  Family History   Problem Relation Name Age of Onset   • Diabetes Father     • Mental illness Father     • Bipolar disorder Father     • Mental illness Brother         Medications  Current Outpatient Medications   Medication Instructions   • cholecalciferol (VITAMIN D-3) 2,000 Units, oral, Daily   • clotrimazole-betamethasone (Lotrisone) cream 1 Application, Topical, Daily PRN   • cyclobenzaprine (FLEXERIL) 10 mg, oral, 3 times daily PRN   • insulin aspart (NOVOLOG) 2-6 Units, subcutaneous, 3 times daily with meals   • insulin degludec (TRESIBA U-100 INSULIN) 34 Units, subcutaneous, Daily   • meloxicam (MOBIC) 15 mg, oral, Daily   • metFORMIN (Glucophage) 1,000 mg tablet TAKE 1 TABLET BY MOUTH TWICE DAILY WITH A MEAL   • multivitamin tablet One-A-Day Men's 50+ Complete Multivitamin. 1 tablet once/day.   • predniSONE (DELTASONE) 10 mg, oral, Daily       Allergies  Carbamazepine and Dapagliflozin    Review of Systems   Constitutional:  Positive for unexpected weight change (gain).   Eyes:  Negative for  visual disturbance.   Respiratory:  Negative for shortness of breath.    Cardiovascular:  Negative for chest pain.   Gastrointestinal:  Negative for constipation, diarrhea, nausea and vomiting.   Endocrine:        As above   Genitourinary:  Negative for difficulty urinating and frequency.   Musculoskeletal:  Positive for arthralgias and myalgias.   Skin:         As above         Last Recorded Vitals  Weight 98 kg (216 lb).    Physical Exam  Constitutional:       General: He is not in acute distress.  HENT:      Head: Normocephalic.      Mouth/Throat:      Mouth: Mucous membranes are moist.   Eyes:      Extraocular Movements: Extraocular movements intact.   Neck:      Thyroid: No thyromegaly.   Cardiovascular:      Pulses:           Radial pulses are 1+ on the right side and 1+ on the left side.        Dorsalis pedis pulses are 2+ on the right side and 2+ on the left side.   Musculoskeletal:      Right lower leg: No edema.      Left lower leg: No edema.      Right foot: No deformity.      Left foot: No deformity.   Skin:     Comments: No foot sores   Neurological:      Mental Status: He is alert.      Motor: Tremor present.   Psychiatric:         Mood and Affect: Affect normal.          Relevant Results  Glucose   Date Value   12/09/2023 193 mg/dL (H)   10/22/2022 214 MG/DL (H)   04/14/2021 111 mg/dL (H)   02/22/2020 240 mg/dL (H)     Hemoglobin A1C (%)   Date Value   04/14/2021 6.9   06/18/2019 7.6   03/08/2019 8.2     Bicarbonate   Date Value   12/09/2023 28 mmol/L   10/22/2022 28 MMOL/L   04/14/2021 29 mmol/L   02/22/2020 28 mmol/L     Urea Nitrogen   Date Value   12/09/2023 17 mg/dL   10/22/2022 15 MG/DL   04/14/2021 17 mg/dL   02/22/2020 17 mg/dL     Creatinine   Date Value   12/09/2023 0.85 mg/dL   10/22/2022 0.9 MG/DL   04/14/2021 0.88 mg/dL   02/22/2020 0.84 mg/dL     Lab Results   Component Value Date    CHOL 216 (H) 12/09/2023    CHOL 215 (H) 10/22/2022    CHOL 187 04/14/2021     Lab Results   Component  Value Date    HDL 44.0 12/09/2023    HDL 48 10/22/2022    HDL 39.3 (A) 04/14/2021     Lab Results   Component Value Date    LDLCALC 150 (H) 12/09/2023    LDLCALC 145 (H) 10/22/2022     Lab Results   Component Value Date    TRIG 111 12/09/2023    TRIG 109 10/22/2022    TRIG 125 04/14/2021     Lab Results   Component Value Date    TSH 2.67 12/09/2023     Lab Results   Component Value Date    ALBUR 12 10/22/2022          IMPRESSION  TYPE 2 DIABETES MELLITUS  LONG TERM CURRENT INSULIN USE  Rapid A1c 8.1%  No improvement in A1c  Insulin demand now increased by prednisone use      RECOMMENDATIONS  Tresiba 58 units once daily    Novolog 10 units before meals if glucose 100-150, add 2 units per 50 over glucose 101 mg/dl.    Follow up 3 months

## 2024-02-20 NOTE — PROGRESS NOTES
History Of Present Illness  Kelvin Engle is a 58 y.o. male     Duration of type 2 diabetes mellitus:  17 years  Complications:  none    Interval diagnosis of polymyalgia rheumatica  Started on prednisone 10 mg/day    Basal cell carcinoma at right temple, pending excision    Metformin 1000 mg BID  Tresiba 52 units QAM      Novolog at meals, typically 10+ units since starting prednisone     FreeStyle Phu 2, 288 tests per day.   Records reviewed, on file     Last eye exam May 2023     Past Medical History  He has a past medical history of Arthralgia of temporomandibular joint, unspecified side (08/15/2015), Candidal stomatitis (11/07/2018), Disorder of the skin and subcutaneous tissue, unspecified (07/12/2016), Encounter for screening for malignant neoplasm of prostate (11/09/2015), Hyperlipidemia, unspecified (03/19/2021), Leukoplakia of oral mucosa, including tongue (11/05/2018), Pain in left foot (05/11/2018), Pain in throat (08/15/2015), Personal history of diseases of the skin and subcutaneous tissue (12/19/2018), Personal history of other diseases of the nervous system and sense organs, Personal history of other diseases of the respiratory system (08/06/2014), Personal history of other specified conditions (09/23/2016), Personal history of other specified conditions (09/23/2016), Personal history of other specified conditions (12/07/2014), Personal history of other specified conditions (10/28/2013), Personal history of other specified conditions (05/28/2016), Personal history of other specified conditions, Personal history of urinary (tract) infections (12/19/2013), Plantar fascial fibromatosis (02/23/2018), Procedure and treatment not carried out because of patient's decision for unspecified reasons (02/08/2018), Procedure and treatment not carried out because of patient's decision for unspecified reasons (03/19/2021), Spermatocele of epididymis, unspecified (07/24/2016), and Unspecified convulsions  (CMS/[facility]).    Surgical History  He has a past surgical history that includes Tonsillectomy (02/08/2018).     Social History  He reports that he has never smoked. He has never used smokeless tobacco. He reports current alcohol use of about 7.0 - 14.0 standard drinks of alcohol per week. He reports that he does not use drugs.    Family History  Family History   Problem Relation Name Age of Onset    Diabetes Father      Mental illness Father      Bipolar disorder Father      Mental illness Brother         Medications  Current Outpatient Medications   Medication Instructions    cholecalciferol (VITAMIN D-3) 2,000 Units, oral, Daily    clotrimazole-betamethasone (Lotrisone) cream 1 Application, Topical, Daily PRN    cyclobenzaprine (FLEXERIL) 10 mg, oral, 3 times daily PRN    insulin aspart (NOVOLOG) 2-6 Units, subcutaneous, 3 times daily with meals    insulin degludec (TRESIBA U-100 INSULIN) 34 Units, subcutaneous, Daily    meloxicam (MOBIC) 15 mg, oral, Daily    metFORMIN (Glucophage) 1,000 mg tablet TAKE 1 TABLET BY MOUTH TWICE DAILY WITH A MEAL    multivitamin tablet One-A-Day Men's 50+ Complete Multivitamin. 1 tablet once/day.    predniSONE (DELTASONE) 10 mg, oral, Daily       Allergies  Carbamazepine and Dapagliflozin    Review of Systems   Constitutional:  Positive for unexpected weight change (gain).   Eyes:  Negative for visual disturbance.   Respiratory:  Negative for shortness of breath.    Cardiovascular:  Negative for chest pain.   Gastrointestinal:  Negative for constipation, diarrhea, nausea and vomiting.   Endocrine:        As above   Genitourinary:  Negative for difficulty urinating and frequency.   Musculoskeletal:  Positive for arthralgias and myalgias.   Skin:         As above         Last Recorded Vitals  Weight 98 kg (216 lb).    Physical Exam  Constitutional:       General: He is not in acute distress.  HENT:      Head: Normocephalic.      Mouth/Throat:      Mouth: Mucous membranes are moist.    Eyes:      Extraocular Movements: Extraocular movements intact.   Neck:      Thyroid: No thyromegaly.   Cardiovascular:      Pulses:           Radial pulses are 1+ on the right side and 1+ on the left side.        Dorsalis pedis pulses are 2+ on the right side and 2+ on the left side.   Musculoskeletal:      Right lower leg: No edema.      Left lower leg: No edema.      Right foot: No deformity.      Left foot: No deformity.   Skin:     Comments: No foot sores   Neurological:      Mental Status: He is alert.      Motor: Tremor present.   Psychiatric:         Mood and Affect: Affect normal.          Relevant Results  Glucose   Date Value   12/09/2023 193 mg/dL (H)   10/22/2022 214 MG/DL (H)   04/14/2021 111 mg/dL (H)   02/22/2020 240 mg/dL (H)     Hemoglobin A1C (%)   Date Value   04/14/2021 6.9   06/18/2019 7.6   03/08/2019 8.2     Bicarbonate   Date Value   12/09/2023 28 mmol/L   10/22/2022 28 MMOL/L   04/14/2021 29 mmol/L   02/22/2020 28 mmol/L     Urea Nitrogen   Date Value   12/09/2023 17 mg/dL   10/22/2022 15 MG/DL   04/14/2021 17 mg/dL   02/22/2020 17 mg/dL     Creatinine   Date Value   12/09/2023 0.85 mg/dL   10/22/2022 0.9 MG/DL   04/14/2021 0.88 mg/dL   02/22/2020 0.84 mg/dL     Lab Results   Component Value Date    CHOL 216 (H) 12/09/2023    CHOL 215 (H) 10/22/2022    CHOL 187 04/14/2021     Lab Results   Component Value Date    HDL 44.0 12/09/2023    HDL 48 10/22/2022    HDL 39.3 (A) 04/14/2021     Lab Results   Component Value Date    LDLCALC 150 (H) 12/09/2023    LDLCALC 145 (H) 10/22/2022     Lab Results   Component Value Date    TRIG 111 12/09/2023    TRIG 109 10/22/2022    TRIG 125 04/14/2021     Lab Results   Component Value Date    TSH 2.67 12/09/2023     Lab Results   Component Value Date    ALBUR 12 10/22/2022          IMPRESSION  TYPE 2 DIABETES MELLITUS  LONG TERM CURRENT INSULIN USE  Rapid A1c 8.1%  No improvement in A1c  Insulin demand now increased by prednisone  use      RECOMMENDATIONS  Tresiba 58 units once daily    Novolog 10 units before meals if glucose 100-150, add 2 units per 50 over glucose 101 mg/dl.    Follow up 3 months

## 2024-02-20 NOTE — PATIENT INSTRUCTIONS
A1c 8.1%    RECOMMENDATIONS  Tresiba 58 units once daily    Novolog 10 units before meals if glucose 100-150, add 2 units per 50 over glucose 101 mg/dl.    Follow up 3 months

## 2024-03-04 ENCOUNTER — OFFICE VISIT (OUTPATIENT)
Dept: RHEUMATOLOGY | Facility: CLINIC | Age: 59
End: 2024-03-04
Payer: COMMERCIAL

## 2024-03-04 ENCOUNTER — LAB (OUTPATIENT)
Dept: LAB | Facility: LAB | Age: 59
End: 2024-03-04
Payer: COMMERCIAL

## 2024-03-04 VITALS — SYSTOLIC BLOOD PRESSURE: 142 MMHG | BODY MASS INDEX: 32.05 KG/M2 | DIASTOLIC BLOOD PRESSURE: 80 MMHG | WEIGHT: 217 LBS

## 2024-03-04 DIAGNOSIS — M35.3 POLYMYALGIA RHEUMATICA SYNDROME (MULTI): Primary | ICD-10-CM

## 2024-03-04 DIAGNOSIS — M19.90 OSTEOARTHRITIS, UNSPECIFIED OSTEOARTHRITIS TYPE, UNSPECIFIED SITE: ICD-10-CM

## 2024-03-04 DIAGNOSIS — M35.3 POLYMYALGIA RHEUMATICA SYNDROME (MULTI): ICD-10-CM

## 2024-03-04 LAB
CRP SERPL-MCNC: 0.32 MG/DL
HBV CORE AB SER QL: NONREACTIVE
HBV SURFACE AG SERPL QL IA: NONREACTIVE
HCV AB SER QL: NONREACTIVE

## 2024-03-04 PROCEDURE — 86481 TB AG RESPONSE T-CELL SUSP: CPT

## 2024-03-04 PROCEDURE — 3079F DIAST BP 80-89 MM HG: CPT | Performed by: INTERNAL MEDICINE

## 2024-03-04 PROCEDURE — 99214 OFFICE O/P EST MOD 30 MIN: CPT | Performed by: INTERNAL MEDICINE

## 2024-03-04 PROCEDURE — 87340 HEPATITIS B SURFACE AG IA: CPT

## 2024-03-04 PROCEDURE — 36415 COLL VENOUS BLD VENIPUNCTURE: CPT

## 2024-03-04 PROCEDURE — 3077F SYST BP >= 140 MM HG: CPT | Performed by: INTERNAL MEDICINE

## 2024-03-04 PROCEDURE — 86140 C-REACTIVE PROTEIN: CPT

## 2024-03-04 PROCEDURE — 1036F TOBACCO NON-USER: CPT | Performed by: INTERNAL MEDICINE

## 2024-03-04 PROCEDURE — 86803 HEPATITIS C AB TEST: CPT

## 2024-03-04 PROCEDURE — 86704 HEP B CORE ANTIBODY TOTAL: CPT

## 2024-03-04 RX ORDER — SARILUMAB 200 MG/1.14ML
200 INJECTION, SOLUTION SUBCUTANEOUS
Qty: 2.28 ML | Refills: 2 | Status: SHIPPED | OUTPATIENT
Start: 2024-03-04 | End: 2024-03-25 | Stop reason: SDUPTHER

## 2024-03-05 ENCOUNTER — SPECIALTY PHARMACY (OUTPATIENT)
Dept: PHARMACY | Facility: CLINIC | Age: 59
End: 2024-03-05

## 2024-03-06 LAB
NIL(NEG) CONTROL SPOT COUNT: NORMAL
PANEL A SPOT COUNT: 0
PANEL B SPOT COUNT: 0
POS CONTROL SPOT COUNT: NORMAL
T-SPOT. TB INTERPRETATION: NEGATIVE

## 2024-03-12 ENCOUNTER — PROCEDURE VISIT (OUTPATIENT)
Dept: DERMATOLOGY | Facility: CLINIC | Age: 59
End: 2024-03-12
Payer: COMMERCIAL

## 2024-03-12 DIAGNOSIS — C44.91 BASAL CELL CARCINOMA (BCC), UNSPECIFIED SITE: ICD-10-CM

## 2024-03-12 PROCEDURE — 11643 EXC F/E/E/N/L MAL+MRG 2.1-3: CPT | Performed by: SPECIALIST

## 2024-03-12 PROCEDURE — 88305 TISSUE EXAM BY PATHOLOGIST: CPT | Performed by: DERMATOLOGY

## 2024-03-12 PROCEDURE — 13132 CMPLX RPR F/C/C/M/N/AX/G/H/F: CPT | Performed by: SPECIALIST

## 2024-03-12 NOTE — PROGRESS NOTES
Subjective   HPI: Kelvin Engle is a 58 y.o. male is here for evaluation and treatment of a skin cancer right cheek close to my ear.    ROS: No other skin or systemic complaints other than what is documented elsewhere in the note.    ALLERGIES: Carbamazepine and Dapagliflozin    SOCIAL:  reports that he has never smoked. He has never used smokeless tobacco. He reports current alcohol use of about 7.0 - 14.0 standard drinks of alcohol per week. He reports that he does not use drugs.    Objective     Description: This is a obvious surgical scar at the previous biopsy site.  This involves the right cheek preauricular area.    Assessment/Plan   1. Basal cell carcinoma (BCC), unspecified site  Right Preauricular Area    Skin excision    Specimen 1 - Dermatopathology- DERM LAB  Differential Diagnosis: BCC   Check Margins Yes/No?:    Comments:  Re-excsion [id number]  Dermpath Lab: Routine Histopathology (formalin-fixed tissue)       Plan: Numerous questions answered regarding protocol and excision of cyst and have this will be sent to the lab.  Sterile surgical excision.    FOLLOW UP: 7 days for suture removal.    The patient was encouraged to contact me with any further questions or concerns.  Sushil Salamanca MD  3/12/2024

## 2024-03-12 NOTE — PROGRESS NOTES
Subjective     Kelvin Engle is a 58 y.o. male who presents for the following: SS Procedure ([id number]).     Review of Systems:  No other skin or systemic complaints other than what is documented elsewhere in the note.    The following portions of the chart were reviewed this encounter and updated as appropriate:          Skin Cancer History  No skin cancer on file.      Specialty Problems    None       Objective   Well appearing patient in no apparent distress; mood and affect are within normal limits.    A focused skin examination was performed. All findings within normal limits unless otherwise noted below.    Assessment/Plan   1. Basal cell carcinoma (BCC), unspecified site  Right Preauricular Area    Skin excision    Instrument used: #15 blade      Specimen 1 - Dermatopathology- DERM LAB  Differential Diagnosis: BCC   Check Margins Yes/No?:    Comments:  Re-excsion [id number]  Dermpath Lab: Routine Histopathology (formalin-fixed tissue)

## 2024-03-22 ENCOUNTER — CLINICAL SUPPORT (OUTPATIENT)
Dept: DERMATOLOGY | Facility: CLINIC | Age: 59
End: 2024-03-22
Payer: COMMERCIAL

## 2024-03-22 DIAGNOSIS — Z48.02 VISIT FOR SUTURE REMOVAL: ICD-10-CM

## 2024-03-25 DIAGNOSIS — M35.3 POLYMYALGIA RHEUMATICA SYNDROME (MULTI): ICD-10-CM

## 2024-03-25 RX ORDER — SARILUMAB 200 MG/1.14ML
200 INJECTION, SOLUTION SUBCUTANEOUS
Qty: 2.28 ML | Refills: 2 | Status: SHIPPED | OUTPATIENT
Start: 2024-03-25 | End: 2024-04-01

## 2024-04-01 DIAGNOSIS — M35.3 POLYMYALGIA RHEUMATICA SYNDROME (MULTI): Primary | ICD-10-CM

## 2024-04-01 RX ORDER — PREDNISONE 5 MG/1
5 TABLET ORAL DAILY
Qty: 30 TABLET | Refills: 3 | Status: SHIPPED | OUTPATIENT
Start: 2024-04-01 | End: 2024-05-22 | Stop reason: SDUPTHER

## 2024-04-04 ENCOUNTER — APPOINTMENT (OUTPATIENT)
Dept: GASTROENTEROLOGY | Facility: EXTERNAL LOCATION | Age: 59
End: 2024-04-04
Payer: COMMERCIAL

## 2024-04-15 ENCOUNTER — LAB (OUTPATIENT)
Dept: LAB | Facility: LAB | Age: 59
End: 2024-04-15
Payer: COMMERCIAL

## 2024-04-15 ENCOUNTER — OFFICE VISIT (OUTPATIENT)
Dept: RHEUMATOLOGY | Facility: CLINIC | Age: 59
End: 2024-04-15
Payer: COMMERCIAL

## 2024-04-15 VITALS — DIASTOLIC BLOOD PRESSURE: 70 MMHG | WEIGHT: 217 LBS | BODY MASS INDEX: 32.05 KG/M2 | SYSTOLIC BLOOD PRESSURE: 118 MMHG

## 2024-04-15 DIAGNOSIS — M25.50 POLYARTHRALGIA: ICD-10-CM

## 2024-04-15 DIAGNOSIS — M25.50 POLYARTHRALGIA: Primary | ICD-10-CM

## 2024-04-15 DIAGNOSIS — M19.90 OSTEOARTHRITIS, UNSPECIFIED OSTEOARTHRITIS TYPE, UNSPECIFIED SITE: ICD-10-CM

## 2024-04-15 LAB
CRP SERPL-MCNC: 0.22 MG/DL
RHEUMATOID FACT SER NEPH-ACNC: <10 IU/ML (ref 0–15)

## 2024-04-15 PROCEDURE — 36415 COLL VENOUS BLD VENIPUNCTURE: CPT

## 2024-04-15 PROCEDURE — 3078F DIAST BP <80 MM HG: CPT | Performed by: INTERNAL MEDICINE

## 2024-04-15 PROCEDURE — 86038 ANTINUCLEAR ANTIBODIES: CPT

## 2024-04-15 PROCEDURE — 86140 C-REACTIVE PROTEIN: CPT

## 2024-04-15 PROCEDURE — 99214 OFFICE O/P EST MOD 30 MIN: CPT | Performed by: INTERNAL MEDICINE

## 2024-04-15 PROCEDURE — 86431 RHEUMATOID FACTOR QUANT: CPT

## 2024-04-15 PROCEDURE — 3074F SYST BP LT 130 MM HG: CPT | Performed by: INTERNAL MEDICINE

## 2024-04-15 PROCEDURE — 86200 CCP ANTIBODY: CPT

## 2024-04-15 RX ORDER — HYDROXYCHLOROQUINE SULFATE 200 MG/1
200 TABLET, FILM COATED ORAL 2 TIMES DAILY
Qty: 60 TABLET | Refills: 3 | Status: SHIPPED | OUTPATIENT
Start: 2024-04-15 | End: 2024-10-12

## 2024-04-15 NOTE — PROGRESS NOTES
"Recheck  OA  /  PMR   doing well       \"OK enough - let's just say that\"  He has wrist pain and migratory pains in ankles, knees, and hips.   No swelling.  ?AM stiffness \"I don't have real stiffness, probably a couple steps up, when I going out the door for work, 45 min\"  Sometimes things get better in the afternoon.  Wrists and shoulders hurt.  He said that he didn't hear anything about kevzara for \"1-2 months, and then they started calling me and sending me things from the specialty pharmacy\" (I put in rx on 3/4, and he decided by 4/1 he didn't want to take it) and then he started to look at potential side-effects, and since his sugars are better, and he doesn't have as much pain, he didn't want to take kevzara.  He had 1d of L-sided CP - doesn't think it was heart pain but rather lung pain - didn't call anyone since it only lasted 1d.  No SOB.  Occ cough.  Occ abd pain, so he stopped meloxicam, and the pain stopped.  It didn't seem to help his joint sx.  Occ HA.  He has \"sort of a migrainish\" HA yesterday.  He said that metformin seemed to treat his HA, so it was odd that he got a bad one yesterday.  No tongue/jaw maxim or visual changes.      PE  Good TA pulses, NT, no head/neck bruits  RRR no r/m/g  CTA  2+ DP, PT, and rad pulses  No edema  No synovitis    A/P - Pt with OA and ?PMR vs inflam arthritis.  He decided against kevzara.  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    Check further labs  He still has not had DEXA  Trial of hcq - expl risks/benefits/yrly eye exam  Reeval 8 wks or sooner PRN  "

## 2024-04-16 LAB
ANA SER QL HEP2 SUBST: NEGATIVE
CCP IGG SERPL-ACNC: 2 U/ML

## 2024-04-26 ENCOUNTER — APPOINTMENT (OUTPATIENT)
Dept: PRIMARY CARE | Facility: CLINIC | Age: 59
End: 2024-04-26
Payer: COMMERCIAL

## 2024-04-27 DIAGNOSIS — Z79.4 TYPE 2 DIABETES MELLITUS WITH OTHER SPECIFIED COMPLICATION, WITH LONG-TERM CURRENT USE OF INSULIN (MULTI): ICD-10-CM

## 2024-04-27 DIAGNOSIS — E11.69 TYPE 2 DIABETES MELLITUS WITH OTHER SPECIFIED COMPLICATION, WITH LONG-TERM CURRENT USE OF INSULIN (MULTI): ICD-10-CM

## 2024-04-29 RX ORDER — METFORMIN HYDROCHLORIDE 1000 MG/1
1000 TABLET ORAL
Qty: 180 TABLET | Refills: 3 | Status: SHIPPED | OUTPATIENT
Start: 2024-04-29

## 2024-05-22 DIAGNOSIS — M35.3 POLYMYALGIA RHEUMATICA SYNDROME (MULTI): ICD-10-CM

## 2024-05-22 RX ORDER — PREDNISONE 5 MG/1
5 TABLET ORAL DAILY
Qty: 30 TABLET | Refills: 2 | Status: SHIPPED | OUTPATIENT
Start: 2024-05-22 | End: 2024-06-10 | Stop reason: SDUPTHER

## 2024-05-22 RX ORDER — PREDNISONE 5 MG/1
5 TABLET ORAL DAILY
Qty: 30 TABLET | Refills: 2 | Status: SHIPPED | OUTPATIENT
Start: 2024-05-22 | End: 2024-05-22 | Stop reason: SDUPTHER

## 2024-05-28 ENCOUNTER — OFFICE VISIT (OUTPATIENT)
Dept: ENDOCRINOLOGY | Facility: CLINIC | Age: 59
End: 2024-05-28
Payer: COMMERCIAL

## 2024-05-28 VITALS
SYSTOLIC BLOOD PRESSURE: 134 MMHG | HEART RATE: 64 BPM | WEIGHT: 217.4 LBS | DIASTOLIC BLOOD PRESSURE: 79 MMHG | BODY MASS INDEX: 32.1 KG/M2

## 2024-05-28 DIAGNOSIS — E11.65 TYPE 2 DIABETES MELLITUS WITH HYPERGLYCEMIA, WITH LONG-TERM CURRENT USE OF INSULIN (MULTI): Primary | ICD-10-CM

## 2024-05-28 DIAGNOSIS — E11.69 TYPE 2 DIABETES MELLITUS WITH OTHER SPECIFIED COMPLICATION, WITH LONG-TERM CURRENT USE OF INSULIN (MULTI): ICD-10-CM

## 2024-05-28 DIAGNOSIS — Z79.4 TYPE 2 DIABETES MELLITUS WITH OTHER SPECIFIED COMPLICATION, WITH LONG-TERM CURRENT USE OF INSULIN (MULTI): ICD-10-CM

## 2024-05-28 DIAGNOSIS — Z79.4 TYPE 2 DIABETES MELLITUS WITH HYPERGLYCEMIA, WITH LONG-TERM CURRENT USE OF INSULIN (MULTI): Primary | ICD-10-CM

## 2024-05-28 LAB — POC HEMOGLOBIN A1C: 7.3 % (ref 4.2–6.5)

## 2024-05-28 PROCEDURE — 83036 HEMOGLOBIN GLYCOSYLATED A1C: CPT | Performed by: INTERNAL MEDICINE

## 2024-05-28 PROCEDURE — 3078F DIAST BP <80 MM HG: CPT | Performed by: INTERNAL MEDICINE

## 2024-05-28 PROCEDURE — 3075F SYST BP GE 130 - 139MM HG: CPT | Performed by: INTERNAL MEDICINE

## 2024-05-28 PROCEDURE — 1036F TOBACCO NON-USER: CPT | Performed by: INTERNAL MEDICINE

## 2024-05-28 PROCEDURE — 99214 OFFICE O/P EST MOD 30 MIN: CPT | Performed by: INTERNAL MEDICINE

## 2024-05-28 RX ORDER — INSULIN DEGLUDEC 100 U/ML
42 INJECTION, SOLUTION SUBCUTANEOUS EVERY MORNING
Qty: 45 ML | Refills: 3 | Status: SHIPPED | OUTPATIENT
Start: 2024-05-28 | End: 2024-06-03 | Stop reason: ALTCHOICE

## 2024-05-28 RX ORDER — INSULIN ASPART 100 [IU]/ML
4-12 INJECTION, SOLUTION INTRAVENOUS; SUBCUTANEOUS
Qty: 30 ML | Refills: 3 | Status: SHIPPED | OUTPATIENT
Start: 2024-05-28 | End: 2025-05-28

## 2024-05-28 ASSESSMENT — ENCOUNTER SYMPTOMS
NAUSEA: 0
ARTHRALGIAS: 1
DIARRHEA: 0
CONSTIPATION: 0
NERVOUS/ANXIOUS: 0
HEADACHES: 0
SHORTNESS OF BREATH: 0
ABDOMINAL PAIN: 0
APPETITE CHANGE: 0
FATIGUE: 1
VOMITING: 0
COUGH: 1
POLYDIPSIA: 0
FEVER: 0
ROS SKIN COMMENTS: DRY
FREQUENCY: 0
MYALGIAS: 1
BACK PAIN: 1
SORE THROAT: 0

## 2024-05-28 NOTE — PROGRESS NOTES
History Of Present Illness  Kelvin Engle is a 58 y.o. male     Duration of type 2 diabetes mellitus:  17 years  Complications:  none     Diagnosis of polymyalgia rheumatica, but atypical  Prednisone decreased from 10 to 5 mg/day  Added hydroxychloroquine  Pains at hands, hips and feet improved    Basal cell carcinoma at right temple, pending excision     Metformin 1000 mg BID  Tresiba 42-46units QAM   Novolog at meals, typically 6-10 units since starting prednisone     FreeStyle Phu 2, 288 tests per day.   Records reviewed, on file     Last eye exam May 2023       Past Medical History  He has a past medical history of Arthralgia of temporomandibular joint, unspecified side (08/15/2015), Candidal stomatitis (11/07/2018), Disorder of the skin and subcutaneous tissue, unspecified (07/12/2016), Encounter for screening for malignant neoplasm of prostate (11/09/2015), Hyperlipidemia, unspecified (03/19/2021), Leukoplakia of oral mucosa, including tongue (11/05/2018), Pain in left foot (05/11/2018), Pain in throat (08/15/2015), Personal history of diseases of the skin and subcutaneous tissue (12/19/2018), Personal history of other diseases of the nervous system and sense organs, Personal history of other diseases of the respiratory system (08/06/2014), Personal history of other specified conditions (09/23/2016), Personal history of other specified conditions (09/23/2016), Personal history of other specified conditions (12/07/2014), Personal history of other specified conditions (10/28/2013), Personal history of other specified conditions (05/28/2016), Personal history of other specified conditions, Personal history of urinary (tract) infections (12/19/2013), Plantar fascial fibromatosis (02/23/2018), Procedure and treatment not carried out because of patient's decision for unspecified reasons (02/08/2018), Procedure and treatment not carried out because of patient's decision for unspecified reasons (03/19/2021),  Spermatocele of epididymis, unspecified (07/24/2016), and Unspecified convulsions (Multi).    Surgical History  He has a past surgical history that includes Tonsillectomy (02/08/2018).     Social History  He reports that he has never smoked. He has never used smokeless tobacco. He reports current alcohol use of about 7.0 - 14.0 standard drinks of alcohol per week. He reports that he does not use drugs.    Family History  Family History   Problem Relation Name Age of Onset    Diabetes Father      Mental illness Father      Bipolar disorder Father      Mental illness Brother         Medications  Current Outpatient Medications   Medication Instructions    cholecalciferol (VITAMIN D-3) 2,000 Units, oral, Daily    clotrimazole-betamethasone (Lotrisone) cream 1 Application, Topical, Daily PRN    cyclobenzaprine (FLEXERIL) 10 mg, oral, 3 times daily PRN    hydroxychloroquine (PLAQUENIL) 200 mg, oral, 2 times daily    insulin aspart (NOVOLOG) 2-6 Units, subcutaneous, 3 times daily (morning, midday, late afternoon)    insulin degludec (TRESIBA U-100 INSULIN) 34 Units, subcutaneous, Daily    metFORMIN (GLUCOPHAGE) 1,000 mg, oral, 2 times daily (morning and late afternoon)    multivitamin tablet One-A-Day Men's 50+ Complete Multivitamin. 1 tablet once/day.    predniSONE (DELTASONE) 5 mg, oral, Daily       Allergies  Carbamazepine and Dapagliflozin    Review of Systems   Constitutional:  Positive for fatigue. Negative for appetite change and fever.   HENT:  Negative for sore throat.         Denies dry mouth   Eyes:  Negative for visual disturbance.   Respiratory:  Positive for cough. Negative for shortness of breath.    Cardiovascular:  Negative for chest pain.   Gastrointestinal:  Negative for abdominal pain, constipation, diarrhea, nausea and vomiting.   Endocrine: Negative for polydipsia and polyuria.   Genitourinary:  Negative for frequency.        Erectile dysfunction   Musculoskeletal:  Positive for arthralgias, back pain  and myalgias.   Skin:  Negative for rash.        dry   Neurological:  Negative for headaches.   Psychiatric/Behavioral:  The patient is not nervous/anxious.          Last Recorded Vitals  Blood pressure 134/79, pulse 64, weight 98.6 kg (217 lb 6.4 oz).    Physical Exam  Constitutional:       General: He is not in acute distress.  HENT:      Head: Normocephalic.      Mouth/Throat:      Mouth: Mucous membranes are moist.   Eyes:      Extraocular Movements: Extraocular movements intact.   Neck:      Thyroid: No thyroid mass or thyromegaly.   Cardiovascular:      Pulses:           Radial pulses are 2+ on the right side and 2+ on the left side.        Dorsalis pedis pulses are 2+ on the right side and 2+ on the left side.   Musculoskeletal:      Right lower leg: No edema.      Left lower leg: No edema.   Feet:      Comments: Flat feet  Lymphadenopathy:      Cervical: No cervical adenopathy.   Skin:     Comments: No foot sores   Neurological:      Mental Status: He is alert.      Motor: No tremor.   Psychiatric:         Mood and Affect: Affect normal.          Relevant Results  Glucose   Date Value   12/09/2023 193 mg/dL (H)   10/22/2022 214 MG/DL (H)   04/14/2021 111 mg/dL (H)   02/22/2020 240 mg/dL (H)     POC HEMOGLOBIN A1c (%)   Date Value   02/20/2024 8.1 (A)     Hemoglobin A1C (%)   Date Value   04/14/2021 6.9   06/18/2019 7.6   03/08/2019 8.2     Bicarbonate   Date Value   12/09/2023 28 mmol/L   10/22/2022 28 MMOL/L   04/14/2021 29 mmol/L   02/22/2020 28 mmol/L     Urea Nitrogen   Date Value   12/09/2023 17 mg/dL   10/22/2022 15 MG/DL   04/14/2021 17 mg/dL   02/22/2020 17 mg/dL     Creatinine   Date Value   12/09/2023 0.85 mg/dL   10/22/2022 0.9 MG/DL   04/14/2021 0.88 mg/dL   02/22/2020 0.84 mg/dL     Lab Results   Component Value Date    CHOL 216 (H) 12/09/2023    CHOL 215 (H) 10/22/2022    CHOL 187 04/14/2021     Lab Results   Component Value Date    HDL 44.0 12/09/2023    HDL 48 10/22/2022    HDL 39.3 (A)  04/14/2021     Lab Results   Component Value Date    LDLCALC 150 (H) 12/09/2023    LDLCALC 145 (H) 10/22/2022     Lab Results   Component Value Date    TRIG 111 12/09/2023    TRIG 109 10/22/2022    TRIG 125 04/14/2021     Lab Results   Component Value Date    TSH 2.67 12/09/2023     Lab Results   Component Value Date    ALBUR 12 10/22/2022          IMPRESSION  TYPE 2 DIABETES MELLITUS WITH HYPERGLYCEMIA  LONG TERM CURRENT INSULIN USE   Rapid A1c 7.3%  A1c improved  Postprandial hyperglycemia  Decreased prednisone      RECOMMENDATIONS  Tresiba 42 units every morning    Novolog before meals, 4 unit if glucose is 120-150 mg/dl, add 2 units for every 50 over glucose over 101 mg/dl    If physically active, decrease indicated Novolog by 4 units    Follow up 3 months

## 2024-05-28 NOTE — LETTER
May 28, 2024     Sushil Mckinney DO  [address]  [address]  [facility] [address]    Patient: Kelvin Engle   YOB: 1965   Date of Visit: 5/28/2024       Dear Dr. Sushil Mckinney DO:    Thank you for referring Kelvin Engle to me for evaluation. Below are my notes for this consultation.  If you have questions, please do not hesitate to call me. I look forward to following your patient along with you.       Sincerely,     Isaias Mcknight MD      CC: No Recipients  ______________________________________________________________________________________    History Of Present Illness  Kelvin Engle is a 58 y.o. male     Duration of type 2 diabetes mellitus:  17 years  Complications:  none     Diagnosis of polymyalgia rheumatica, but atypical  Prednisone decreased from 10 to 5 mg/day  Added hydroxychloroquine  Pains at hands, hips and feet improved    Basal cell carcinoma at right temple, pending excision     Metformin 1000 mg BID  Tresiba 42-46units QAM   Novolog at meals, typically 6-10 units since starting prednisone     FreeStyle Phu 2, 288 tests per day.   Records reviewed, on file     Last eye exam May 2023       Past Medical History  He has a past medical history of Arthralgia of temporomandibular joint, unspecified side (08/15/2015), Candidal stomatitis (11/07/2018), Disorder of the skin and subcutaneous tissue, unspecified (07/12/2016), Encounter for screening for malignant neoplasm of prostate (11/09/2015), Hyperlipidemia, unspecified (03/19/2021), Leukoplakia of oral mucosa, including tongue (11/05/2018), Pain in left foot (05/11/2018), Pain in throat (08/15/2015), Personal history of diseases of the skin and subcutaneous tissue (12/19/2018), Personal history of other diseases of the nervous system and sense organs, Personal history of other diseases of the respiratory system (08/06/2014), Personal history of other specified conditions (09/23/2016), Personal history of other specified  conditions (09/23/2016), Personal history of other specified conditions (12/07/2014), Personal history of other specified conditions (10/28/2013), Personal history of other specified conditions (05/28/2016), Personal history of other specified conditions, Personal history of urinary (tract) infections (12/19/2013), Plantar fascial fibromatosis (02/23/2018), Procedure and treatment not carried out because of patient's decision for unspecified reasons (02/08/2018), Procedure and treatment not carried out because of patient's decision for unspecified reasons (03/19/2021), Spermatocele of epididymis, unspecified (07/24/2016), and Unspecified convulsions (Multi).    Surgical History  He has a past surgical history that includes Tonsillectomy (02/08/2018).     Social History  He reports that he has never smoked. He has never used smokeless tobacco. He reports current alcohol use of about 7.0 - 14.0 standard drinks of alcohol per week. He reports that he does not use drugs.    Family History  Family History   Problem Relation Name Age of Onset   • Diabetes Father     • Mental illness Father     • Bipolar disorder Father     • Mental illness Brother         Medications  Current Outpatient Medications   Medication Instructions   • cholecalciferol (VITAMIN D-3) 2,000 Units, oral, Daily   • clotrimazole-betamethasone (Lotrisone) cream 1 Application, Topical, Daily PRN   • cyclobenzaprine (FLEXERIL) 10 mg, oral, 3 times daily PRN   • hydroxychloroquine (PLAQUENIL) 200 mg, oral, 2 times daily   • insulin aspart (NOVOLOG) 2-6 Units, subcutaneous, 3 times daily (morning, midday, late afternoon)   • insulin degludec (TRESIBA U-100 INSULIN) 34 Units, subcutaneous, Daily   • metFORMIN (GLUCOPHAGE) 1,000 mg, oral, 2 times daily (morning and late afternoon)   • multivitamin tablet One-A-Day Men's 50+ Complete Multivitamin. 1 tablet once/day.   • predniSONE (DELTASONE) 5 mg, oral, Daily       Allergies  Carbamazepine and  Dapagliflozin    Review of Systems   Constitutional:  Positive for fatigue. Negative for appetite change and fever.   HENT:  Negative for sore throat.         Denies dry mouth   Eyes:  Negative for visual disturbance.   Respiratory:  Positive for cough. Negative for shortness of breath.    Cardiovascular:  Negative for chest pain.   Gastrointestinal:  Negative for abdominal pain, constipation, diarrhea, nausea and vomiting.   Endocrine: Negative for polydipsia and polyuria.   Genitourinary:  Negative for frequency.        Erectile dysfunction   Musculoskeletal:  Positive for arthralgias, back pain and myalgias.   Skin:  Negative for rash.        dry   Neurological:  Negative for headaches.   Psychiatric/Behavioral:  The patient is not nervous/anxious.          Last Recorded Vitals  Blood pressure 134/79, pulse 64, weight 98.6 kg (217 lb 6.4 oz).    Physical Exam  Constitutional:       General: He is not in acute distress.  HENT:      Head: Normocephalic.      Mouth/Throat:      Mouth: Mucous membranes are moist.   Eyes:      Extraocular Movements: Extraocular movements intact.   Neck:      Thyroid: No thyroid mass or thyromegaly.   Cardiovascular:      Pulses:           Radial pulses are 2+ on the right side and 2+ on the left side.        Dorsalis pedis pulses are 2+ on the right side and 2+ on the left side.   Musculoskeletal:      Right lower leg: No edema.      Left lower leg: No edema.   Feet:      Comments: Flat feet  Lymphadenopathy:      Cervical: No cervical adenopathy.   Skin:     Comments: No foot sores   Neurological:      Mental Status: He is alert.      Motor: No tremor.   Psychiatric:         Mood and Affect: Affect normal.          Relevant Results  Glucose   Date Value   12/09/2023 193 mg/dL (H)   10/22/2022 214 MG/DL (H)   04/14/2021 111 mg/dL (H)   02/22/2020 240 mg/dL (H)     POC HEMOGLOBIN A1c (%)   Date Value   02/20/2024 8.1 (A)     Hemoglobin A1C (%)   Date Value   04/14/2021 6.9   06/18/2019  7.6   03/08/2019 8.2     Bicarbonate   Date Value   12/09/2023 28 mmol/L   10/22/2022 28 MMOL/L   04/14/2021 29 mmol/L   02/22/2020 28 mmol/L     Urea Nitrogen   Date Value   12/09/2023 17 mg/dL   10/22/2022 15 MG/DL   04/14/2021 17 mg/dL   02/22/2020 17 mg/dL     Creatinine   Date Value   12/09/2023 0.85 mg/dL   10/22/2022 0.9 MG/DL   04/14/2021 0.88 mg/dL   02/22/2020 0.84 mg/dL     Lab Results   Component Value Date    CHOL 216 (H) 12/09/2023    CHOL 215 (H) 10/22/2022    CHOL 187 04/14/2021     Lab Results   Component Value Date    HDL 44.0 12/09/2023    HDL 48 10/22/2022    HDL 39.3 (A) 04/14/2021     Lab Results   Component Value Date    LDLCALC 150 (H) 12/09/2023    LDLCALC 145 (H) 10/22/2022     Lab Results   Component Value Date    TRIG 111 12/09/2023    TRIG 109 10/22/2022    TRIG 125 04/14/2021     Lab Results   Component Value Date    TSH 2.67 12/09/2023     Lab Results   Component Value Date    ALBUR 12 10/22/2022          IMPRESSION  TYPE 2 DIABETES MELLITUS WITH HYPERGLYCEMIA  LONG TERM CURRENT INSULIN USE   Rapid A1c 7.3%  A1c improved  Postprandial hyperglycemia  Decreased prednisone      RECOMMENDATIONS  Tresiba 42 units every morning    Novolog before meals, 4 unit if glucose is 120-150 mg/dl, add 2 units for every 50 over glucose over 101 mg/dl    If physically active, decrease indicated Novolog by 4 units    Follow up 3 months

## 2024-06-02 DIAGNOSIS — E11.65 TYPE 2 DIABETES MELLITUS WITH HYPERGLYCEMIA, WITH LONG-TERM CURRENT USE OF INSULIN (MULTI): Primary | ICD-10-CM

## 2024-06-02 DIAGNOSIS — Z79.4 TYPE 2 DIABETES MELLITUS WITH HYPERGLYCEMIA, WITH LONG-TERM CURRENT USE OF INSULIN (MULTI): Primary | ICD-10-CM

## 2024-06-03 RX ORDER — INSULIN DEGLUDEC 200 U/ML
34 INJECTION, SOLUTION SUBCUTANEOUS EVERY MORNING
Qty: 18 ML | Refills: 3 | Status: SHIPPED | OUTPATIENT
Start: 2024-06-03

## 2024-06-10 ENCOUNTER — LAB (OUTPATIENT)
Dept: LAB | Facility: LAB | Age: 59
End: 2024-06-10
Payer: COMMERCIAL

## 2024-06-10 ENCOUNTER — OFFICE VISIT (OUTPATIENT)
Dept: RHEUMATOLOGY | Facility: CLINIC | Age: 59
End: 2024-06-10
Payer: COMMERCIAL

## 2024-06-10 VITALS — DIASTOLIC BLOOD PRESSURE: 70 MMHG | BODY MASS INDEX: 31.9 KG/M2 | WEIGHT: 216 LBS | SYSTOLIC BLOOD PRESSURE: 112 MMHG

## 2024-06-10 DIAGNOSIS — M25.50 POLYARTHRALGIA: ICD-10-CM

## 2024-06-10 DIAGNOSIS — M35.3 POLYMYALGIA RHEUMATICA SYNDROME (MULTI): ICD-10-CM

## 2024-06-10 LAB — CRP SERPL-MCNC: 0.23 MG/DL

## 2024-06-10 PROCEDURE — 36415 COLL VENOUS BLD VENIPUNCTURE: CPT

## 2024-06-10 PROCEDURE — 3074F SYST BP LT 130 MM HG: CPT | Performed by: INTERNAL MEDICINE

## 2024-06-10 PROCEDURE — 3078F DIAST BP <80 MM HG: CPT | Performed by: INTERNAL MEDICINE

## 2024-06-10 PROCEDURE — 99214 OFFICE O/P EST MOD 30 MIN: CPT | Performed by: INTERNAL MEDICINE

## 2024-06-10 PROCEDURE — 86140 C-REACTIVE PROTEIN: CPT

## 2024-06-10 RX ORDER — PREDNISONE 1 MG/1
TABLET ORAL
Qty: 140 TABLET | Refills: 0 | Status: SHIPPED | OUTPATIENT
Start: 2024-06-10

## 2024-06-10 NOTE — PROGRESS NOTES
"Recheck  OA  /  PMR   doing well with prednisone 5 mg daily    HPI - \"doing ok\"  L shoulder is stiff.  He also c/o hip stiffness in AM \"I've been running more\"  AM stiffness 5 min.  No other pain.  His R ankle swells - he is trying new socks that don't tighten around his leg, which seem to help.    No HA, tongue/jaw maxim, visual changes, CP, SOB, or GI other than 1 epside of abd pain.  \"Normal coughing\" - wonders if he has a cold because he was congested\", although at lat OV, he c/o cough - when I pointed this out, he said \"it's gone now.  He hasn't noticed any difference with hcq - although he says that he's  only getting pain with activity that then improves the more he moves.  He wants to taper off the pred.      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  Chronic intermittent knee pain  Pt had self-decr pred and now wants off.  He had declined kevzara  ?if hcq is helping sx.  Taper pred to off - pt to call if sx incr  Check CRP  Reeval 3 mo or sooner PRN      "

## 2024-07-09 DIAGNOSIS — M35.3 POLYMYALGIA RHEUMATICA SYNDROME (MULTI): ICD-10-CM

## 2024-07-09 RX ORDER — PREDNISONE 1 MG/1
TABLET ORAL
Qty: 50 TABLET | Refills: 0 | Status: SHIPPED | OUTPATIENT
Start: 2024-07-09

## 2024-08-08 DIAGNOSIS — M25.50 POLYARTHRALGIA: ICD-10-CM

## 2024-08-09 RX ORDER — HYDROXYCHLOROQUINE SULFATE 200 MG/1
TABLET, FILM COATED ORAL 2 TIMES DAILY
Qty: 60 TABLET | Refills: 3 | Status: SHIPPED | OUTPATIENT
Start: 2024-08-09

## 2024-09-03 ENCOUNTER — APPOINTMENT (OUTPATIENT)
Dept: ENDOCRINOLOGY | Facility: CLINIC | Age: 59
End: 2024-09-03
Payer: COMMERCIAL

## 2024-09-03 VITALS
WEIGHT: 215 LBS | DIASTOLIC BLOOD PRESSURE: 79 MMHG | HEART RATE: 82 BPM | SYSTOLIC BLOOD PRESSURE: 146 MMHG | BODY MASS INDEX: 31.75 KG/M2

## 2024-09-03 DIAGNOSIS — E11.9 TYPE 2 DIABETES MELLITUS WITHOUT COMPLICATION, WITH LONG-TERM CURRENT USE OF INSULIN (MULTI): ICD-10-CM

## 2024-09-03 DIAGNOSIS — Z79.4 TYPE 2 DIABETES MELLITUS WITHOUT COMPLICATION, WITH LONG-TERM CURRENT USE OF INSULIN (MULTI): ICD-10-CM

## 2024-09-03 LAB — POC HEMOGLOBIN A1C: 7 % (ref 4.2–6.5)

## 2024-09-03 PROCEDURE — 3077F SYST BP >= 140 MM HG: CPT | Performed by: INTERNAL MEDICINE

## 2024-09-03 PROCEDURE — 1036F TOBACCO NON-USER: CPT | Performed by: INTERNAL MEDICINE

## 2024-09-03 PROCEDURE — 99214 OFFICE O/P EST MOD 30 MIN: CPT | Performed by: INTERNAL MEDICINE

## 2024-09-03 PROCEDURE — 83036 HEMOGLOBIN GLYCOSYLATED A1C: CPT | Performed by: INTERNAL MEDICINE

## 2024-09-03 PROCEDURE — 3078F DIAST BP <80 MM HG: CPT | Performed by: INTERNAL MEDICINE

## 2024-09-03 RX ORDER — INSULIN DEGLUDEC 200 U/ML
42 INJECTION, SOLUTION SUBCUTANEOUS EVERY MORNING
Qty: 27 ML | Refills: 3 | Status: SHIPPED | OUTPATIENT
Start: 2024-09-03

## 2024-09-03 ASSESSMENT — ENCOUNTER SYMPTOMS
FEVER: 0
SORE THROAT: 0
HEADACHES: 0
POLYDIPSIA: 0
CONSTIPATION: 0
DIARRHEA: 0
NECK PAIN: 1
COUGH: 0
APPETITE CHANGE: 0
FREQUENCY: 0
ABDOMINAL PAIN: 0
ARTHRALGIAS: 1
NERVOUS/ANXIOUS: 0
NAUSEA: 0
MYALGIAS: 1
SHORTNESS OF BREATH: 0
NUMBNESS: 1
VOMITING: 0

## 2024-09-03 NOTE — PATIENT INSTRUCTIONS
A1c 7%      RECOMMENDATIONS  Continue current insulin program    Follow up 6 months  Review Phu at all appointments

## 2024-09-03 NOTE — LETTER
September 3, 2024     Sushil Mckinney DO  [address]  [address]  [facility] [address]    Patient: Kelvin Engle   YOB: 1965   Date of Visit: 9/3/2024       Dear Dr. Sushil Mckinney DO:    Thank you for referring Kelvin Engle to me for evaluation. Below are my notes for this consultation.  If you have questions, please do not hesitate to call me. I look forward to following your patient along with you.       Sincerely,     Isaias Mcknight MD      CC: No Recipients  ______________________________________________________________________________________    History Of Present Illness  Kelvin Engle is a 58 y.o. male     Duration of type 2 diabetes mellitus:  17 years  Complications:  none     Diagnosis of polymyalgia rheumatica, but atypical  Prednisone off  On hydroxychloroquine     Metformin 1000 mg BID  Tresiba 42 units QAM   Novolog at meals, typically 6-10 units since starting prednisone     FreeStyle Phu 2, 288 tests per day.   Upload failed    Last eye exam July 2024      Past Medical History  He has a past medical history of Arthralgia of temporomandibular joint, unspecified side (08/15/2015), Candidal stomatitis (11/07/2018), Disorder of the skin and subcutaneous tissue, unspecified (07/12/2016), Encounter for screening for malignant neoplasm of prostate (11/09/2015), Hyperlipidemia, unspecified (03/19/2021), Leukoplakia of oral mucosa, including tongue (11/05/2018), Pain in left foot (05/11/2018), Pain in throat (08/15/2015), Personal history of diseases of the skin and subcutaneous tissue (12/19/2018), Personal history of other diseases of the nervous system and sense organs, Personal history of other diseases of the respiratory system (08/06/2014), Personal history of other specified conditions (09/23/2016), Personal history of other specified conditions (09/23/2016), Personal history of other specified conditions (12/07/2014), Personal history of other specified conditions  (10/28/2013), Personal history of other specified conditions (05/28/2016), Personal history of other specified conditions, Personal history of urinary (tract) infections (12/19/2013), Plantar fascial fibromatosis (02/23/2018), Procedure and treatment not carried out because of patient's decision for unspecified reasons (02/08/2018), Procedure and treatment not carried out because of patient's decision for unspecified reasons (03/19/2021), Spermatocele of epididymis, unspecified (07/24/2016), and Unspecified convulsions (Multi).    Surgical History  He has a past surgical history that includes Tonsillectomy (02/08/2018).     Social History  He reports that he has never smoked. He has never used smokeless tobacco. He reports current alcohol use of about 7.0 - 14.0 standard drinks of alcohol per week. He reports that he does not use drugs.    Family History  Family History   Problem Relation Name Age of Onset   • Diabetes Father     • Mental illness Father     • Bipolar disorder Father     • Mental illness Brother         Medications  Current Outpatient Medications   Medication Instructions   • cholecalciferol (VITAMIN D-3) 2,000 Units, oral, Daily   • clotrimazole-betamethasone (Lotrisone) cream 1 Application, Topical, Daily PRN   • cyclobenzaprine (FLEXERIL) 10 mg, oral, 3 times daily PRN   • hydroxychloroquine (Plaquenil) 200 mg tablet oral, 2 times daily   • insulin aspart (NOVOLOG) 4-12 Units, subcutaneous, 3 times daily before meals   • insulin degludec (TRESIBA FLEXTOUCH U-200) 34 Units, subcutaneous, Every morning   • metFORMIN (GLUCOPHAGE) 1,000 mg, oral, 2 times daily (morning and late afternoon)   • multivitamin tablet One-A-Day Men's 50+ Complete Multivitamin. 1 tablet once/day.       Allergies  Carbamazepine and Dapagliflozin    Review of Systems   Constitutional:  Negative for appetite change and fever.   HENT:  Negative for sore throat.         Denies dry mouth   Eyes:  Negative for visual disturbance.    Respiratory:  Negative for cough and shortness of breath.    Cardiovascular:  Negative for chest pain.   Gastrointestinal:  Negative for abdominal pain, constipation, diarrhea, nausea and vomiting.   Endocrine: Negative for polydipsia and polyuria.   Genitourinary:  Negative for frequency.        Erectile dysfunction   Musculoskeletal:  Positive for arthralgias, myalgias and neck pain.   Skin:  Negative for rash.   Neurological:  Positive for numbness. Negative for headaches.   Psychiatric/Behavioral:  The patient is not nervous/anxious.          Last Recorded Vitals  Blood pressure 146/79, pulse 82, weight 97.5 kg (215 lb).    Physical Exam  Constitutional:       General: He is not in acute distress.  HENT:      Head: Normocephalic.      Mouth/Throat:      Mouth: Mucous membranes are moist.   Eyes:      Extraocular Movements: Extraocular movements intact.   Neck:      Thyroid: No thyroid mass or thyromegaly.   Cardiovascular:      Pulses:           Radial pulses are 2+ on the right side and 2+ on the left side.      Comments: Varicose veins right lower leg  Musculoskeletal:      Right lower leg: No edema.      Left lower leg: No edema.   Feet:      Comments: Flat feet  Lymphadenopathy:      Cervical: No cervical adenopathy.   Skin:     Comments: No foot sores   Neurological:      Mental Status: He is alert.      Motor: No tremor.   Psychiatric:         Mood and Affect: Affect normal.          Relevant Results  Glucose   Date Value   12/09/2023 193 mg/dL (H)   10/22/2022 214 MG/DL (H)   04/14/2021 111 mg/dL (H)   02/22/2020 240 mg/dL (H)     POC HEMOGLOBIN A1c (%)   Date Value   09/03/2024 7.0 (A)   05/28/2024 7.3 (A)   02/20/2024 8.1 (A)     Bicarbonate   Date Value   12/09/2023 28 mmol/L   10/22/2022 28 MMOL/L   04/14/2021 29 mmol/L   02/22/2020 28 mmol/L     Urea Nitrogen   Date Value   12/09/2023 17 mg/dL   10/22/2022 15 MG/DL   04/14/2021 17 mg/dL   02/22/2020 17 mg/dL     Creatinine   Date Value   12/09/2023  0.85 mg/dL   10/22/2022 0.9 MG/DL   04/14/2021 0.88 mg/dL   02/22/2020 0.84 mg/dL     Lab Results   Component Value Date    CHOL 216 (H) 12/09/2023    CHOL 215 (H) 10/22/2022    CHOL 187 04/14/2021     Lab Results   Component Value Date    HDL 44.0 12/09/2023    HDL 48 10/22/2022    HDL 39.3 (A) 04/14/2021     Lab Results   Component Value Date    LDLCALC 150 (H) 12/09/2023    LDLCALC 145 (H) 10/22/2022     Lab Results   Component Value Date    TRIG 111 12/09/2023    TRIG 109 10/22/2022    TRIG 125 04/14/2021     Lab Results   Component Value Date    TSH 2.67 12/09/2023     Lab Results   Component Value Date    ALBUR 12 10/22/2022          IMPRESSION  TYPE 2 DIABETES MELLITUS  LONG TERM CURRENT INSULIN USE   Rapid A1c 7.0%  A1c improved  Off prednisone      RECOMMENDATIONS  Continue current insulin program    Follow up 6 months  Review Phu at all appointments

## 2024-09-03 NOTE — PROGRESS NOTES
History Of Present Illness  Kelvin Engle is a 58 y.o. male     Duration of type 2 diabetes mellitus:  17 years  Complications:  none     Diagnosis of polymyalgia rheumatica, but atypical  Prednisone off  On hydroxychloroquine     Metformin 1000 mg BID  Tresiba 42 units QAM   Novolog at meals, typically 6-10 units since starting prednisone     FreeStyle Phu 2, 288 tests per day.   Upload failed    Last eye exam July 2024      Past Medical History  He has a past medical history of Arthralgia of temporomandibular joint, unspecified side (08/15/2015), Candidal stomatitis (11/07/2018), Disorder of the skin and subcutaneous tissue, unspecified (07/12/2016), Encounter for screening for malignant neoplasm of prostate (11/09/2015), Hyperlipidemia, unspecified (03/19/2021), Leukoplakia of oral mucosa, including tongue (11/05/2018), Pain in left foot (05/11/2018), Pain in throat (08/15/2015), Personal history of diseases of the skin and subcutaneous tissue (12/19/2018), Personal history of other diseases of the nervous system and sense organs, Personal history of other diseases of the respiratory system (08/06/2014), Personal history of other specified conditions (09/23/2016), Personal history of other specified conditions (09/23/2016), Personal history of other specified conditions (12/07/2014), Personal history of other specified conditions (10/28/2013), Personal history of other specified conditions (05/28/2016), Personal history of other specified conditions, Personal history of urinary (tract) infections (12/19/2013), Plantar fascial fibromatosis (02/23/2018), Procedure and treatment not carried out because of patient's decision for unspecified reasons (02/08/2018), Procedure and treatment not carried out because of patient's decision for unspecified reasons (03/19/2021), Spermatocele of epididymis, unspecified (07/24/2016), and Unspecified convulsions (Multi).    Surgical History  He has a past surgical history that  includes Tonsillectomy (02/08/2018).     Social History  He reports that he has never smoked. He has never used smokeless tobacco. He reports current alcohol use of about 7.0 - 14.0 standard drinks of alcohol per week. He reports that he does not use drugs.    Family History  Family History   Problem Relation Name Age of Onset    Diabetes Father      Mental illness Father      Bipolar disorder Father      Mental illness Brother         Medications  Current Outpatient Medications   Medication Instructions    cholecalciferol (VITAMIN D-3) 2,000 Units, oral, Daily    clotrimazole-betamethasone (Lotrisone) cream 1 Application, Topical, Daily PRN    cyclobenzaprine (FLEXERIL) 10 mg, oral, 3 times daily PRN    hydroxychloroquine (Plaquenil) 200 mg tablet oral, 2 times daily    insulin aspart (NOVOLOG) 4-12 Units, subcutaneous, 3 times daily before meals    insulin degludec (TRESIBA FLEXTOUCH U-200) 34 Units, subcutaneous, Every morning    metFORMIN (GLUCOPHAGE) 1,000 mg, oral, 2 times daily (morning and late afternoon)    multivitamin tablet One-A-Day Men's 50+ Complete Multivitamin. 1 tablet once/day.       Allergies  Carbamazepine and Dapagliflozin    Review of Systems   Constitutional:  Negative for appetite change and fever.   HENT:  Negative for sore throat.         Denies dry mouth   Eyes:  Negative for visual disturbance.   Respiratory:  Negative for cough and shortness of breath.    Cardiovascular:  Negative for chest pain.   Gastrointestinal:  Negative for abdominal pain, constipation, diarrhea, nausea and vomiting.   Endocrine: Negative for polydipsia and polyuria.   Genitourinary:  Negative for frequency.        Erectile dysfunction   Musculoskeletal:  Positive for arthralgias, myalgias and neck pain.   Skin:  Negative for rash.   Neurological:  Positive for numbness. Negative for headaches.   Psychiatric/Behavioral:  The patient is not nervous/anxious.          Last Recorded Vitals  Blood pressure 146/79,  pulse 82, weight 97.5 kg (215 lb).    Physical Exam  Constitutional:       General: He is not in acute distress.  HENT:      Head: Normocephalic.      Mouth/Throat:      Mouth: Mucous membranes are moist.   Eyes:      Extraocular Movements: Extraocular movements intact.   Neck:      Thyroid: No thyroid mass or thyromegaly.   Cardiovascular:      Pulses:           Radial pulses are 2+ on the right side and 2+ on the left side.      Comments: Varicose veins right lower leg  Musculoskeletal:      Right lower leg: No edema.      Left lower leg: No edema.   Feet:      Comments: Flat feet  Lymphadenopathy:      Cervical: No cervical adenopathy.   Skin:     Comments: No foot sores   Neurological:      Mental Status: He is alert.      Motor: No tremor.   Psychiatric:         Mood and Affect: Affect normal.          Relevant Results  Glucose   Date Value   12/09/2023 193 mg/dL (H)   10/22/2022 214 MG/DL (H)   04/14/2021 111 mg/dL (H)   02/22/2020 240 mg/dL (H)     POC HEMOGLOBIN A1c (%)   Date Value   09/03/2024 7.0 (A)   05/28/2024 7.3 (A)   02/20/2024 8.1 (A)     Bicarbonate   Date Value   12/09/2023 28 mmol/L   10/22/2022 28 MMOL/L   04/14/2021 29 mmol/L   02/22/2020 28 mmol/L     Urea Nitrogen   Date Value   12/09/2023 17 mg/dL   10/22/2022 15 MG/DL   04/14/2021 17 mg/dL   02/22/2020 17 mg/dL     Creatinine   Date Value   12/09/2023 0.85 mg/dL   10/22/2022 0.9 MG/DL   04/14/2021 0.88 mg/dL   02/22/2020 0.84 mg/dL     Lab Results   Component Value Date    CHOL 216 (H) 12/09/2023    CHOL 215 (H) 10/22/2022    CHOL 187 04/14/2021     Lab Results   Component Value Date    HDL 44.0 12/09/2023    HDL 48 10/22/2022    HDL 39.3 (A) 04/14/2021     Lab Results   Component Value Date    LDLCALC 150 (H) 12/09/2023    LDLCALC 145 (H) 10/22/2022     Lab Results   Component Value Date    TRIG 111 12/09/2023    TRIG 109 10/22/2022    TRIG 125 04/14/2021     Lab Results   Component Value Date    TSH 2.67 12/09/2023     Lab Results    Component Value Date    ALBUR 12 10/22/2022          IMPRESSION  TYPE 2 DIABETES MELLITUS  LONG TERM CURRENT INSULIN USE   Rapid A1c 7.0%  A1c improved  Off prednisone      RECOMMENDATIONS  Continue current insulin program    Follow up 6 months  Review Phu at all appointments

## 2024-09-09 ENCOUNTER — LAB (OUTPATIENT)
Dept: LAB | Facility: LAB | Age: 59
End: 2024-09-09
Payer: COMMERCIAL

## 2024-09-09 ENCOUNTER — OFFICE VISIT (OUTPATIENT)
Dept: RHEUMATOLOGY | Facility: CLINIC | Age: 59
End: 2024-09-09
Payer: COMMERCIAL

## 2024-09-09 VITALS — WEIGHT: 219 LBS | SYSTOLIC BLOOD PRESSURE: 138 MMHG | BODY MASS INDEX: 32.34 KG/M2 | DIASTOLIC BLOOD PRESSURE: 72 MMHG

## 2024-09-09 DIAGNOSIS — M35.3 POLYMYALGIA RHEUMATICA SYNDROME (MULTI): ICD-10-CM

## 2024-09-09 DIAGNOSIS — M35.3 POLYMYALGIA RHEUMATICA SYNDROME (MULTI): Primary | ICD-10-CM

## 2024-09-09 DIAGNOSIS — M25.50 POLYARTHRALGIA: ICD-10-CM

## 2024-09-09 LAB — CRP SERPL-MCNC: 0.2 MG/DL

## 2024-09-09 PROCEDURE — 86140 C-REACTIVE PROTEIN: CPT

## 2024-09-09 PROCEDURE — 99214 OFFICE O/P EST MOD 30 MIN: CPT | Performed by: INTERNAL MEDICINE

## 2024-09-09 PROCEDURE — 36415 COLL VENOUS BLD VENIPUNCTURE: CPT

## 2024-09-09 PROCEDURE — 3075F SYST BP GE 130 - 139MM HG: CPT | Performed by: INTERNAL MEDICINE

## 2024-09-09 PROCEDURE — 3078F DIAST BP <80 MM HG: CPT | Performed by: INTERNAL MEDICINE

## 2024-09-09 NOTE — PROGRESS NOTES
"Recheck  OA  /  PMR    Off prednisone x 7 weeks and doing well with exception of bilat hand / wrist and left shoulder.      HPI - He feels better of pred.  His LBP is gone, and he doesn't have leg weakness.  He still has L shoulder pain, but it has improved.  His wrists hurt.  No swelling.  AM stiffness 15-20 min.  Occ HA.  No tongue/jaw maxim, or visual changes.  He had some CP/tightness \"asthma tightness\"  Has \"tightness in breathing\" Uses MDI occ.  No GI.      PE  NAD  Good TA pulses, NT, no head/neck bruits  RRR no r/m/g  CTA  2+ DP, PT, and rad pulses  No edema  No synovitis  NT and no pain with ROM throughout    A/P - ?PMR vs palindromic rheumatism.  Doing well off prednisone  Check CRP  He still has not had DEXA done  He wants to stop hcq and Follow up PRN        "

## 2024-12-09 ENCOUNTER — OFFICE VISIT (OUTPATIENT)
Dept: PRIMARY CARE | Facility: CLINIC | Age: 59
End: 2024-12-09
Payer: COMMERCIAL

## 2024-12-09 VITALS
TEMPERATURE: 98 F | SYSTOLIC BLOOD PRESSURE: 140 MMHG | HEIGHT: 69 IN | WEIGHT: 220 LBS | BODY MASS INDEX: 32.58 KG/M2 | OXYGEN SATURATION: 98 % | DIASTOLIC BLOOD PRESSURE: 86 MMHG | HEART RATE: 75 BPM

## 2024-12-09 DIAGNOSIS — Z85.828 HISTORY OF BASAL CELL CARCINOMA (BCC) OF SKIN: ICD-10-CM

## 2024-12-09 DIAGNOSIS — R23.8 SCALP IRRITATION: Primary | ICD-10-CM

## 2024-12-09 DIAGNOSIS — Z12.83 SKIN CANCER SCREENING: ICD-10-CM

## 2024-12-09 PROCEDURE — 3077F SYST BP >= 140 MM HG: CPT | Performed by: FAMILY MEDICINE

## 2024-12-09 PROCEDURE — 99214 OFFICE O/P EST MOD 30 MIN: CPT | Performed by: FAMILY MEDICINE

## 2024-12-09 PROCEDURE — 1036F TOBACCO NON-USER: CPT | Performed by: FAMILY MEDICINE

## 2024-12-09 PROCEDURE — 3008F BODY MASS INDEX DOCD: CPT | Performed by: FAMILY MEDICINE

## 2024-12-09 PROCEDURE — 3079F DIAST BP 80-89 MM HG: CPT | Performed by: FAMILY MEDICINE

## 2024-12-09 RX ORDER — KETOCONAZOLE 20 MG/ML
SHAMPOO, SUSPENSION TOPICAL 2 TIMES WEEKLY
Qty: 120 ML | Refills: 0 | Status: SHIPPED | OUTPATIENT
Start: 2024-12-09

## 2024-12-09 ASSESSMENT — PATIENT HEALTH QUESTIONNAIRE - PHQ9
2. FEELING DOWN, DEPRESSED OR HOPELESS: NOT AT ALL
1. LITTLE INTEREST OR PLEASURE IN DOING THINGS: NOT AT ALL
SUM OF ALL RESPONSES TO PHQ9 QUESTIONS 1 AND 2: 0

## 2024-12-09 ASSESSMENT — PAIN SCALES - GENERAL: PAINLEVEL_OUTOF10: 4

## 2024-12-09 NOTE — ASSESSMENT & PLAN NOTE
- With history and noted skin changes, recommend making up with dermatology for formal skin screening

## 2024-12-09 NOTE — ASSESSMENT & PLAN NOTE
- Can continue with plans of OTC Selsun Blue trial with prescription antifungal shampoo additionally sent

## 2024-12-09 NOTE — PROGRESS NOTES
Outpatient Visit Note    Chief Complaint   Patient presents with    skin issues     Noticed a scratch on left side of face since Saturday and bumps on head for a few months. He is unsure if he should be worried about this and if he needs a referral to a dermatologist.          HPI:  Kelvin Engle is a 58 y.o. male who presents to the office secondary to scalp and facial skin issues.  He was last seen in the office approximately 1 year ago with complaints of persistent bilateral left hip pain.      At the time, patient remained and established patient of Dr. Kelvin Naidu, having recently been seen for physical exam in October 2023.  At that time he did mention bilateral hip pain to which x-rays were ordered.  Did have x-rays completed on 11/8/2023.  Stated to have reached out to office regarding his test results as he had not heard anything.  Noted pain to be worsening over the last 24 hours so he set up alternative appointment to discuss symptoms and findings.    In review, patient states that bilateral hip pain started gradually over the past several weeks following recent travel.  Does state to have been hiking and biking without specific injury.  Has had aching throbbing gluteal and lateral hip pain which is worse with changing position or when laying down at night.  Typically rates pain as a 6-8/10.  Has primarily been managing with Motrin which does provide slight, temporary pain relief.  Has been having limitations in mobility as well as sleep disruption secondary to pain.  Denies any significant history of prior hip injury.  Does have history of recurrent low back pain which has been slightly aggravated with episode.  Denies any lower extremity radiculopathy, saddle anesthesia or bowel/bladder dysfunction.  Of note, chart review does show patient to be an insulin-dependent diabetic    Under the care of Dr. Ferrell of endocrinology. With patient's joint pain, he additionally established in interval  with rheumatology home he follows regularly.  He additionally has established relationship with dermatology having had previous basal cell carcinoma and cherry angioma.  He was last seen in March 2024.    He reports recurrent bumps on scalp with itching irritation.  Notes that this has been present for the last several months.  Recently bought Selsun Blue OTC shampoo.  Does have frequent flaking.  Notes history of basal cell carcinoma on face to which he had been seen by dermatology in March.  Was unable to locate his previous dermatologist in the system.  Is overdue for full skin screening.  On the side of his face when waking up over the weekend, with appreciated blood.  Has noted flaking skin on face similar to previous basal cell carcinoma.  Is interested in having new dermatology consultation.    Has otherwise been doing well outside of recurrent/vibrating ears that he experiences sometimes balance issues or noted hearing changes    Current Medications  Current Outpatient Medications   Medication Instructions    cholecalciferol (VITAMIN D-3) 2,000 Units, oral, Daily    clotrimazole-betamethasone (Lotrisone) cream 1 Application, Topical, Daily PRN    insulin aspart (NOVOLOG) 4-12 Units, subcutaneous, 3 times daily before meals    ketoconazole (NIZOral) 2 % shampoo Topical, 2 times weekly, Shampoo daily, leave on for 5-10 minutes, then rinse.    metFORMIN (GLUCOPHAGE) 1,000 mg, oral, 2 times daily (morning and late afternoon)    multivitamin tablet One-A-Day Men's 50+ Complete Multivitamin. 1 tablet once/day.    Tresiba FlexTouch U-200 42 Units, subcutaneous, Every morning        Allergies  Allergies   Allergen Reactions    Carbamazepine Hives and Unknown    Dapagliflozin Other and Dizziness     Fatigue        Past Medical History:   Diagnosis Date    Arthralgia of temporomandibular joint, unspecified side 08/15/2015    TMJ arthralgia    Candidal stomatitis 11/07/2018    Thrush    Disorder of the skin and  subcutaneous tissue, unspecified 07/12/2016    Skin lesion    Encounter for screening for malignant neoplasm of prostate 11/09/2015    Encounter for screening for malignant neoplasm of prostate    Hyperlipidemia, unspecified 03/19/2021    Hyperlipidemia    Leukoplakia of oral mucosa, 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    Pain in left foot 05/11/2018    Pain of left heel    Pain in throat 08/15/2015    Throat discomfort    Personal history of diseases of the skin and subcutaneous tissue 12/19/2018    History of drug dermatitis    Personal history of other diseases of the nervous system and sense organs     History of sleep apnea    Personal history of other diseases of the respiratory system 08/06/2014    History of upper respiratory infection    Personal history of other specified conditions 09/23/2016    History of weight gain    Personal history of other specified conditions 09/23/2016    History of fatigue    Personal history of other specified conditions 12/07/2014    History of dysuria    Personal history of other specified conditions 10/28/2013    History of shortness of breath    Personal history of other specified conditions 05/28/2016    History of dizziness    Personal history of other specified conditions     History of snoring    Personal history of urinary (tract) infections 12/19/2013    History of urinary tract infection    Plantar fascial fibromatosis 02/23/2018    Plantar fasciitis, left    Procedure and treatment not carried out because of patient's decision for unspecified reasons 02/08/2018    Statin declined    Procedure and treatment not carried out because of patient's decision for unspecified reasons 03/19/2021    Colonoscopy refused    Spermatocele of epididymis, unspecified 07/24/2016    Spermatocele    Unspecified convulsions (Multi)     Generalized convulsive seizure      Past Surgical History:   Procedure Laterality Date    TONSILLECTOMY  02/08/2018    Tonsillectomy      Family History   Problem Relation Name Age of Onset    Diabetes Father      Mental illness Father      Bipolar disorder Father      Mental illness Brother       Social History     Tobacco Use    Smoking status: Never    Smokeless tobacco: Never   Vaping Use    Vaping status: Never Used   Substance Use Topics    Alcohol use: Yes     Alcohol/week: 7.0 - 14.0 standard drinks of alcohol     Types: 7 - 14 Glasses of wine per week    Drug use: Never       ROS  All pertinent positive symptoms are included in the history of present illness.  All other systems have been reviewed and are negative and noncontributory to this patient's current ailments.    VITAL SIGNS  Vitals:    12/09/24 0758   BP: 140/86   Pulse: 75   Temp: 36.7 °C (98 °F)   SpO2: 98%       PHYSICAL EXAM  GENERAL APPEARANCE: alert and oriented, Pleasant and cooperative, No Acute Distress  HEENT: EOMI, PERRLA, MMM  NECK: Posterior paraspinal TTP with grossly intact cervical range of motion  EXTREMITIES: Mildly reduced hip flexion, negative straight leg, raise no edema  SKIN: normal, no rash, unremarkable  NEUROLOGIC EXAM: non-focal exam  MUSCULOSKELETAL: Minimal lumbar paraspinal TTP  PSYCH: affect is normal, eye contact is good      Assessment/Plan   Problem List Items Addressed This Visit             ICD-10-CM    Scalp irritation - Primary R23.8     - Can continue with plans of OTC Selsun Blue trial with prescription antifungal shampoo additionally sent         Relevant Medications    ketoconazole (NIZOral) 2 % shampoo    Other Relevant Orders    Referral to Dermatology    History of basal cell carcinoma (BCC) of skin Z85.828     - With history and noted skin changes, recommend making up with dermatology for formal skin screening         Relevant Orders    Referral to Dermatology     Other Visit Diagnoses         Codes    Skin cancer screening     Z12.83    Relevant Orders    Referral to Dermatology

## 2024-12-09 NOTE — PATIENT INSTRUCTIONS
Problem List Items Addressed This Visit             ICD-10-CM    Scalp irritation - Primary R23.8     - Can continue with plans of OTC Selsun Blue trial with prescription antifungal shampoo additionally sent         Relevant Medications    ketoconazole (NIZOral) 2 % shampoo    Other Relevant Orders    Referral to Dermatology    History of basal cell carcinoma (BCC) of skin Z85.828     - With history and noted skin changes, recommend making up with dermatology for formal skin screening         Relevant Orders    Referral to Dermatology     Other Visit Diagnoses         Codes    Skin cancer screening     Z12.83    Relevant Orders    Referral to Dermatology

## 2025-01-05 DIAGNOSIS — R23.8 SCALP IRRITATION: ICD-10-CM

## 2025-01-06 RX ORDER — KETOCONAZOLE 20 MG/ML
SHAMPOO, SUSPENSION TOPICAL 2 TIMES WEEKLY
Qty: 120 ML | Refills: 0 | Status: SHIPPED | OUTPATIENT
Start: 2025-01-06

## 2025-02-05 ENCOUNTER — APPOINTMENT (OUTPATIENT)
Dept: DERMATOLOGY | Facility: CLINIC | Age: 60
End: 2025-02-05
Payer: COMMERCIAL

## 2025-02-05 DIAGNOSIS — L57.8 ACTINIC SKIN DAMAGE: ICD-10-CM

## 2025-02-05 DIAGNOSIS — L73.9 FOLLICULITIS: Primary | ICD-10-CM

## 2025-02-05 DIAGNOSIS — L57.0 ACTINIC KERATOSIS: ICD-10-CM

## 2025-02-05 DIAGNOSIS — L21.9 SEBORRHEIC DERMATITIS: ICD-10-CM

## 2025-02-05 DIAGNOSIS — R23.8 SCALP IRRITATION: ICD-10-CM

## 2025-02-05 DIAGNOSIS — Z85.828 PERSONAL HISTORY OF SKIN CANCER: ICD-10-CM

## 2025-02-05 PROCEDURE — 99204 OFFICE O/P NEW MOD 45 MIN: CPT | Performed by: STUDENT IN AN ORGANIZED HEALTH CARE EDUCATION/TRAINING PROGRAM

## 2025-02-05 PROCEDURE — 17000 DESTRUCT PREMALG LESION: CPT | Performed by: STUDENT IN AN ORGANIZED HEALTH CARE EDUCATION/TRAINING PROGRAM

## 2025-02-05 RX ORDER — FLUOCINOLONE ACETONIDE 0.11 MG/ML
OIL TOPICAL
Qty: 120 ML | Refills: 3 | Status: SHIPPED | OUTPATIENT
Start: 2025-02-05

## 2025-02-05 ASSESSMENT — DERMATOLOGY QUALITY OF LIFE (QOL) ASSESSMENT
DATE THE QUALITY-OF-LIFE ASSESSMENT WAS COMPLETED: 67241
RATE HOW EMOTIONALLY BOTHERED YOU ARE BY YOUR SKIN PROBLEM (FOR EXAMPLE, WORRY, EMBARRASSMENT, FRUSTRATION): 0 - NEVER BOTHERED
ARE THERE EXCLUSIONS OR EXCEPTIONS FOR THE QUALITY OF LIFE ASSESSMENT: NO
RATE HOW BOTHERED YOU ARE BY SYMPTOMS OF YOUR SKIN PROBLEM (EG, ITCHING, STINGING BURNING, HURTING OR SKIN IRRITATION): 0 - NEVER BOTHERED
RATE HOW BOTHERED YOU ARE BY EFFECTS OF YOUR SKIN PROBLEMS ON YOUR ACTIVITIES (EG, GOING OUT, ACCOMPLISHING WHAT YOU WANT, WORK ACTIVITIES OR YOUR RELATIONSHIPS WITH OTHERS): 0 - NEVER BOTHERED
WHAT SINGLE SKIN CONDITION LISTED BELOW IS THE PATIENT ANSWERING THE QUALITY-OF-LIFE ASSESSMENT QUESTIONS ABOUT: NONE OF THE ABOVE

## 2025-02-05 ASSESSMENT — PATIENT GLOBAL ASSESSMENT (PGA): PATIENT GLOBAL ASSESSMENT: PATIENT GLOBAL ASSESSMENT:  1 - CLEAR

## 2025-02-05 ASSESSMENT — DERMATOLOGY PATIENT ASSESSMENT
HAVE YOU HAD OR DO YOU HAVE VASCULAR DISEASE: NO
DO YOU USE A TANNING BED: NO
DO YOU HAVE ANY NEW OR CHANGING LESIONS: NO
HAVE YOU HAD OR DO YOU HAVE A STAPH INFECTION: NO
ARE YOU AN ORGAN TRANSPLANT RECIPIENT: NO
DO YOU USE SUNSCREEN: OCCASIONALLY

## 2025-02-05 ASSESSMENT — ITCH NUMERIC RATING SCALE: HOW SEVERE IS YOUR ITCHING?: 0

## 2025-02-05 NOTE — PROGRESS NOTES
Subjective     Kelvin Engle is a 59 y.o. male who presents for the following: Skin Check (FBSE, area of concern on left side of face. HX of BCC).     Review of Systems:  No other skin or systemic complaints other than what is documented elsewhere in the note.    The following portions of the chart were reviewed this encounter and updated as appropriate:         Skin Cancer History  No skin cancer on file.      Specialty Problems          Dermatology Problems    History of basal cell carcinoma (BCC) of skin    Scalp irritation        Objective   Well appearing patient in no apparent distress; mood and affect are within normal limits.    A full examination was performed including scalp, head, eyes, ears, nose, lips, neck, chest, axillae, abdomen, back, buttocks, bilateral upper extremities, bilateral lower extremities, hands, feet, fingers, toes, fingernails, and toenails. All findings within normal limits unless otherwise noted below.    Assessment/Plan   1. Actinic keratosis  Head - Anterior (Face)  Erythematous macule with gritty scale.    Destr of lesion - Head - Anterior (Face)  Complexity: simple    Destruction method: cryotherapy    Informed consent: discussed and consent obtained    Lesion destroyed using liquid nitrogen: Yes    Region frozen until ice ball extended beyond lesion: Yes    Cryotherapy cycles:  1  Outcome: patient tolerated procedure well with no complications    Post-procedure details: wound care instructions given      Related Procedures  Follow Up In Dermatology - Established Patient    2. Folliculitis  Scalp  Follicularly-based erythematous papules and pustules on scalp    - Patient reports itchiness and bumps that has improved but not resolved since using ketoconazole shampoo prescribed by PCP  - Start fluocinonide oil to affected areas of scalp daily as needed    fluocinolone and shower cap 0.01 % oil - Scalp  Apply to scalp twice daily as needed.    3. Seborrheic  dermatitis  Scalp  Erythema with overlying greasy scale.    - Continue ketoconazole 2% shampoo 2-3x/week as maintenance, patient declines refills today    4. Actinic skin damage  Background of photodamage with hyper- and hypo-pigmented macules on the skin    5. Personal history of skin cancer  Right Preauricular Area  Well-healed scar    Personal History of Non-Melanoma Skin Cancer, BCC right preauricular s/p excision 1/2024  -Well healed scar with no evidence of recurrence  -Discussed the need for annual or semi-annual skin examinations and to return sooner if any new or changing lesions are noticed. Patient verbalizes understanding    RTC 1 year FBSE.    Rosa Byers MD    I was present during all key portions of visit including history, exam, discussion/plan and/or procedures and directly supervised our resident during all portions of the visit, follow up care, medications and more    Saurabh Crowe MD

## 2025-02-13 RX ORDER — KETOCONAZOLE 20 MG/ML
SHAMPOO, SUSPENSION TOPICAL 2 TIMES WEEKLY
Qty: 120 ML | Refills: 0 | Status: SHIPPED | OUTPATIENT
Start: 2025-02-13

## 2025-03-18 ENCOUNTER — APPOINTMENT (OUTPATIENT)
Dept: ENDOCRINOLOGY | Facility: CLINIC | Age: 60
End: 2025-03-18
Payer: COMMERCIAL

## 2025-03-18 VITALS
WEIGHT: 223 LBS | BODY MASS INDEX: 32.93 KG/M2 | HEART RATE: 64 BPM | SYSTOLIC BLOOD PRESSURE: 147 MMHG | DIASTOLIC BLOOD PRESSURE: 85 MMHG

## 2025-03-18 DIAGNOSIS — Z79.4 TYPE 2 DIABETES MELLITUS WITHOUT COMPLICATION, WITH LONG-TERM CURRENT USE OF INSULIN (MULTI): ICD-10-CM

## 2025-03-18 DIAGNOSIS — E11.9 TYPE 2 DIABETES MELLITUS WITHOUT COMPLICATION, WITH LONG-TERM CURRENT USE OF INSULIN (MULTI): ICD-10-CM

## 2025-03-18 LAB — POC HEMOGLOBIN A1C: 7.2 % (ref 4.2–6.5)

## 2025-03-18 PROCEDURE — 3079F DIAST BP 80-89 MM HG: CPT | Performed by: INTERNAL MEDICINE

## 2025-03-18 PROCEDURE — 1036F TOBACCO NON-USER: CPT | Performed by: INTERNAL MEDICINE

## 2025-03-18 PROCEDURE — 95251 CONT GLUC MNTR ANALYSIS I&R: CPT | Performed by: INTERNAL MEDICINE

## 2025-03-18 PROCEDURE — 83036 HEMOGLOBIN GLYCOSYLATED A1C: CPT | Performed by: INTERNAL MEDICINE

## 2025-03-18 PROCEDURE — 3077F SYST BP >= 140 MM HG: CPT | Performed by: INTERNAL MEDICINE

## 2025-03-18 PROCEDURE — 99214 OFFICE O/P EST MOD 30 MIN: CPT | Performed by: INTERNAL MEDICINE

## 2025-03-18 RX ORDER — SEMAGLUTIDE 0.68 MG/ML
0.5 INJECTION, SOLUTION SUBCUTANEOUS
Qty: 3 ML | Refills: 11 | Status: SHIPPED | OUTPATIENT
Start: 2025-03-18 | End: 2026-03-18

## 2025-03-18 ASSESSMENT — ENCOUNTER SYMPTOMS
APPETITE CHANGE: 0
UNEXPECTED WEIGHT CHANGE: 1
ABDOMINAL PAIN: 0
COUGH: 0
POLYDIPSIA: 0
WEAKNESS: 1
SORE THROAT: 0
FREQUENCY: 0
DIARRHEA: 0
ARTHRALGIAS: 1
SHORTNESS OF BREATH: 0
FEVER: 0
VOMITING: 0
NAUSEA: 0
HEADACHES: 0
CONSTIPATION: 0
NERVOUS/ANXIOUS: 0

## 2025-03-18 NOTE — PATIENT INSTRUCTIONS
A1c 7.2%    RECOMMENDATIONS  Decrease Lunch Novolog to 6 units    Add OZEMPIC, inject 0.25 mg once every 7 days for 4 weeks.  After 4 weeks, increase to 0.5 mg every 7 days.  Discussed GI side effects.  Discussed association with pancreatitis and, in rodents, with thyroid c-cell tumors.  Injection demonstrated.    When Ozempic is at 0.5 mg/week, decrease Tresiba to 32 units  Decrease Novolog to 4-6 units before meals    Follow up 3-4 months.

## 2025-03-18 NOTE — PROGRESS NOTES
History Of Present Illness  Kelvin Engle is a 59 y.o. male     Duration of type 2 diabetes mellitus:  17 years  Complications:  none     Episode of hypoglycemia while walking     Frequent exercise  Weight gain    Metformin 1000 mg BID  Tresiba 42 units QAM   Novolog 8-10 units before meals    FreeStyle Phu 2  Testing glucose 288 times per day.   Records reviewed, on file     Last eye exam July 2024      Past Medical History  He has a past medical history of Arthralgia of temporomandibular joint, unspecified side (08/15/2015), Candidal stomatitis (11/07/2018), Disorder of the skin and subcutaneous tissue, unspecified (07/12/2016), Encounter for screening for malignant neoplasm of prostate (11/09/2015), Hyperlipidemia, unspecified (03/19/2021), Leukoplakia of oral mucosa, including tongue (11/05/2018), Pain in left foot (05/11/2018), Pain in throat (08/15/2015), Personal history of diseases of the skin and subcutaneous tissue (12/19/2018), Personal history of other diseases of the nervous system and sense organs, Personal history of other diseases of the respiratory system (08/06/2014), Personal history of other specified conditions (09/23/2016), Personal history of other specified conditions (09/23/2016), Personal history of other specified conditions (12/07/2014), Personal history of other specified conditions (10/28/2013), Personal history of other specified conditions (05/28/2016), Personal history of other specified conditions, Personal history of urinary (tract) infections (12/19/2013), Plantar fascial fibromatosis (02/23/2018), Procedure and treatment not carried out because of patient's decision for unspecified reasons (02/08/2018), Procedure and treatment not carried out because of patient's decision for unspecified reasons (03/19/2021), Spermatocele of epididymis, unspecified (07/24/2016), and Unspecified convulsions (Multi).    Surgical History  He has a past surgical history that includes Tonsillectomy  (02/08/2018).     Social History  He reports that he has never smoked. He has never used smokeless tobacco. He reports current alcohol use of about 7.0 - 14.0 standard drinks of alcohol per week. He reports that he does not use drugs.    Family History  Family History   Problem Relation Name Age of Onset    Diabetes Father      Mental illness Father      Bipolar disorder Father      Mental illness Brother         Medications  Current Outpatient Medications   Medication Instructions    cholecalciferol (VITAMIN D-3) 2,000 Units, oral, Daily    clotrimazole-betamethasone (Lotrisone) cream 1 Application, Topical, Daily PRN    fluocinolone and shower cap 0.01 % oil Apply to scalp twice daily as needed.    insulin aspart (NOVOLOG) 4-12 Units, subcutaneous, 3 times daily before meals    ketoconazole (NIZOral) 2 % shampoo Topical, 2 times weekly, Shampoo daily, leave on for 5-10 minutes, then rinse.    metFORMIN (GLUCOPHAGE) 1,000 mg, oral, 2 times daily (morning and late afternoon)    Tresiba FlexTouch U-200 42 Units, subcutaneous, Every morning       Allergies  Carbamazepine and Dapagliflozin    Review of Systems   Constitutional:  Positive for unexpected weight change. Negative for appetite change and fever.   HENT:  Negative for sore throat.         Denies dry mouth   Eyes:  Negative for visual disturbance.   Respiratory:  Negative for cough and shortness of breath.    Cardiovascular:  Negative for chest pain.   Gastrointestinal:  Negative for abdominal pain, constipation, diarrhea, nausea and vomiting.   Endocrine: Negative for polydipsia and polyuria.   Genitourinary:  Negative for frequency.        Erectile dysfunction   Musculoskeletal:  Positive for arthralgias.   Skin:  Negative for rash.   Neurological:  Positive for weakness. Negative for headaches.   Psychiatric/Behavioral:  The patient is not nervous/anxious.          Last Recorded Vitals  Blood pressure 147/85, pulse 64, weight 101 kg (223 lb).    Physical  Exam  Constitutional:       General: He is not in acute distress.  HENT:      Head: Normocephalic.      Mouth/Throat:      Mouth: Mucous membranes are moist.   Eyes:      Extraocular Movements: Extraocular movements intact.   Neck:      Thyroid: No thyroid mass or thyromegaly.   Cardiovascular:      Pulses:           Radial pulses are 2+ on the right side and 2+ on the left side.   Musculoskeletal:      Right lower leg: No edema.      Left lower leg: No edema.   Lymphadenopathy:      Cervical: No cervical adenopathy.   Neurological:      Mental Status: He is alert.      Motor: No tremor.   Psychiatric:         Mood and Affect: Affect normal.          Relevant Results  Glucose   Date Value   12/09/2023 193 mg/dL (H)   10/22/2022 214 MG/DL (H)   04/14/2021 111 mg/dL (H)   02/22/2020 240 mg/dL (H)     POC HEMOGLOBIN A1c (%)   Date Value   09/03/2024 7.0 (A)   05/28/2024 7.3 (A)   02/20/2024 8.1 (A)     Bicarbonate   Date Value   12/09/2023 28 mmol/L   10/22/2022 28 MMOL/L   04/14/2021 29 mmol/L   02/22/2020 28 mmol/L     Urea Nitrogen   Date Value   12/09/2023 17 mg/dL   10/22/2022 15 MG/DL   04/14/2021 17 mg/dL   02/22/2020 17 mg/dL     Creatinine   Date Value   12/09/2023 0.85 mg/dL   10/22/2022 0.9 MG/DL   04/14/2021 0.88 mg/dL   02/22/2020 0.84 mg/dL       CGM INTERPRETATION:  Patient is adhering to and benefitting from continuous glucose monitoring.   Model: FreeStyle Phu 2  Period reviewed:  14 days  Testing glucose 288 times daily    Average blood sugar 176 mg/dL, glucose management indicator 7.5%    Glucose less than 70 mg/dL equals 1% of time worn  Glucose ranging between 70 to 180 mg/dL represented by 53% of time worn  Glucose ranging greater than 180 mg/dL represented by 46% of time worn    >72 hours of data reviewed in order to inform diabetes treatment plan decision making, patient currently at risk for recurrent hypoglycemia safety concerns    IMPRESSION  TYPE 2 DIABETES MELLITUS   LONG TERM CURRENT  INSULIN USE   Rapid A1c 7.2%  Some hypoglycemia after lunch  Weight gain despite exercise      RECOMMENDATIONS  Decrease Lunch Novolog to 6 units    Add OZEMPIC, inject 0.25 mg once every 7 days for 4 weeks.  After 4 weeks, increase to 0.5 mg every 7 days.  Discussed GI side effects.  Discussed association with pancreatitis and, in rodents, with thyroid c-cell tumors.  Injection demonstrated.    When Ozempic is at 0.5 mg/week, decrease Tresiba to 32 units  Decrease Novolog to 4-6 units before meals    Follow up 3-4 months.

## 2025-04-10 ENCOUNTER — APPOINTMENT (OUTPATIENT)
Dept: DERMATOLOGY | Facility: CLINIC | Age: 60
End: 2025-04-10
Payer: COMMERCIAL

## 2025-05-03 DIAGNOSIS — E11.69 TYPE 2 DIABETES MELLITUS WITH OTHER SPECIFIED COMPLICATION, WITH LONG-TERM CURRENT USE OF INSULIN: ICD-10-CM

## 2025-05-03 DIAGNOSIS — Z79.4 TYPE 2 DIABETES MELLITUS WITH OTHER SPECIFIED COMPLICATION, WITH LONG-TERM CURRENT USE OF INSULIN: ICD-10-CM

## 2025-05-06 RX ORDER — METFORMIN HYDROCHLORIDE 1000 MG/1
1000 TABLET ORAL
Qty: 60 TABLET | Refills: 0 | Status: SHIPPED | OUTPATIENT
Start: 2025-05-06

## 2025-05-07 ENCOUNTER — OFFICE VISIT (OUTPATIENT)
Dept: PRIMARY CARE | Facility: CLINIC | Age: 60
End: 2025-05-07
Payer: COMMERCIAL

## 2025-05-07 ENCOUNTER — TELEPHONE (OUTPATIENT)
Dept: PRIMARY CARE | Facility: CLINIC | Age: 60
End: 2025-05-07

## 2025-05-07 VITALS
DIASTOLIC BLOOD PRESSURE: 78 MMHG | HEART RATE: 77 BPM | OXYGEN SATURATION: 96 % | HEIGHT: 71 IN | WEIGHT: 216 LBS | BODY MASS INDEX: 30.24 KG/M2 | TEMPERATURE: 98 F | SYSTOLIC BLOOD PRESSURE: 120 MMHG

## 2025-05-07 DIAGNOSIS — Z79.4 TYPE 2 DIABETES MELLITUS WITHOUT COMPLICATION, WITH LONG-TERM CURRENT USE OF INSULIN: ICD-10-CM

## 2025-05-07 DIAGNOSIS — Z79.4 TYPE 2 DIABETES MELLITUS WITHOUT COMPLICATION, WITH LONG-TERM CURRENT USE OF INSULIN: Primary | ICD-10-CM

## 2025-05-07 DIAGNOSIS — E78.2 MIXED HYPERLIPIDEMIA: ICD-10-CM

## 2025-05-07 DIAGNOSIS — Z12.5 SCREENING FOR PROSTATE CANCER: ICD-10-CM

## 2025-05-07 DIAGNOSIS — E11.9 TYPE 2 DIABETES MELLITUS WITHOUT COMPLICATION, WITH LONG-TERM CURRENT USE OF INSULIN: ICD-10-CM

## 2025-05-07 DIAGNOSIS — E55.9 VITAMIN D DEFICIENCY: ICD-10-CM

## 2025-05-07 DIAGNOSIS — E11.9 TYPE 2 DIABETES MELLITUS WITHOUT COMPLICATION, WITH LONG-TERM CURRENT USE OF INSULIN: Primary | ICD-10-CM

## 2025-05-07 PROCEDURE — 3008F BODY MASS INDEX DOCD: CPT | Performed by: STUDENT IN AN ORGANIZED HEALTH CARE EDUCATION/TRAINING PROGRAM

## 2025-05-07 PROCEDURE — 99213 OFFICE O/P EST LOW 20 MIN: CPT | Performed by: STUDENT IN AN ORGANIZED HEALTH CARE EDUCATION/TRAINING PROGRAM

## 2025-05-07 PROCEDURE — 3051F HG A1C>EQUAL 7.0%<8.0%: CPT | Performed by: STUDENT IN AN ORGANIZED HEALTH CARE EDUCATION/TRAINING PROGRAM

## 2025-05-07 PROCEDURE — 3074F SYST BP LT 130 MM HG: CPT | Performed by: STUDENT IN AN ORGANIZED HEALTH CARE EDUCATION/TRAINING PROGRAM

## 2025-05-07 PROCEDURE — 3078F DIAST BP <80 MM HG: CPT | Performed by: STUDENT IN AN ORGANIZED HEALTH CARE EDUCATION/TRAINING PROGRAM

## 2025-05-07 ASSESSMENT — PATIENT HEALTH QUESTIONNAIRE - PHQ9
1. LITTLE INTEREST OR PLEASURE IN DOING THINGS: NOT AT ALL
SUM OF ALL RESPONSES TO PHQ9 QUESTIONS 1 AND 2: 0
2. FEELING DOWN, DEPRESSED OR HOPELESS: NOT AT ALL

## 2025-05-07 ASSESSMENT — PAIN SCALES - GENERAL: PAINLEVEL_OUTOF10: 0-NO PAIN

## 2025-05-07 NOTE — PROGRESS NOTES
"Subjective   Patient ID: Kelvin Engle is a 59 y.o. male who presents for Establish Care (Pt is here to establish care).    Patient is a /medical .  He is here today to establish care.  He has known insulin-dependent type 2 diabetes.  He has had testing of pancreatic function which reveals minimal endogenous insulin production at this point in time.  Diabetic meds are currently managed by endocrinology.  They have been following his A1c, however have not been checking urine albumin or lipids.    Currently today he is feeling well overall.  He is aware that he is due for colonoscopy.  He had attempted to have one previously, however he had hypoglycemia during the prep.  And ended up eating.  The gastroenterologist that he was supposed to have the scope with had no interest in helping with a prep solution that would allow him to avoid this so he did not end up going back.        Review of Systems   Constitutional:  Negative for chills, fatigue and fever.   HENT:  Negative for congestion, hearing loss, rhinorrhea, sinus pressure, sinus pain and tinnitus.    Eyes:  Negative for visual disturbance.   Respiratory:  Negative for cough, shortness of breath and wheezing.    Cardiovascular:  Negative for chest pain, palpitations and leg swelling.   Gastrointestinal:  Negative for constipation, diarrhea, nausea and vomiting.   Endocrine: Negative for cold intolerance, heat intolerance, polydipsia and polyuria.   Genitourinary:  Negative for dysuria, frequency and urgency.   Musculoskeletal:  Negative for arthralgias and myalgias.   Skin:  Negative for pallor, rash and wound.   Neurological:  Negative for dizziness, light-headedness and headaches.   Psychiatric/Behavioral:  Negative for dysphoric mood and sleep disturbance. The patient is not nervous/anxious.        Objective     Visit Vitals  /78   Pulse 77   Temp 36.7 °C (98 °F)   Ht 1.803 m (5' 11\")   Wt 98 kg (216 lb)   SpO2 96%   BMI 30.13 kg/m² "   Smoking Status Never   BSA 2.22 m²         Physical Exam  Constitutional:       Appearance: Normal appearance. He is obese.   HENT:      Head: Normocephalic and atraumatic.      Right Ear: External ear normal.      Left Ear: External ear normal.   Eyes:      Conjunctiva/sclera: Conjunctivae normal.   Cardiovascular:      Rate and Rhythm: Normal rate and regular rhythm.      Pulses: Normal pulses.      Heart sounds: Normal heart sounds.   Pulmonary:      Effort: Pulmonary effort is normal.      Breath sounds: Normal breath sounds.   Skin:     General: Skin is warm and dry.   Neurological:      Mental Status: He is alert and oriented to person, place, and time.   Psychiatric:         Mood and Affect: Mood normal.         Behavior: Behavior normal.         Assessment & Plan  Type 2 diabetes mellitus without complication, with long-term current use of insulin    Orders:    Lipid panel; Future    Comprehensive metabolic panel; Future    Albumin-Creatinine Ratio, Urine Random; Future  Will update labs as appropriate for his diabetes.  May need to discuss additional medications depending upon results.  If not, patient is to follow-up in 1 year for annual CPE.  Will follow-up with patient once results are available  Reviewed and approved by MARY GREY on 5/8/25 at 6:34 AM.

## 2025-05-07 NOTE — ASSESSMENT & PLAN NOTE
Orders:    Lipid panel; Future    Comprehensive metabolic panel; Future    Albumin-Creatinine Ratio, Urine Random; Future  Will update labs as appropriate for his diabetes.  May need to discuss additional medications depending upon results.  If not, patient is to follow-up in 1 year for annual CPE.

## 2025-05-08 ASSESSMENT — ENCOUNTER SYMPTOMS
MYALGIAS: 0
CHILLS: 0
DYSPHORIC MOOD: 0
SINUS PAIN: 0
SLEEP DISTURBANCE: 0
VOMITING: 0
POLYDIPSIA: 0
DIZZINESS: 0
LIGHT-HEADEDNESS: 0
FATIGUE: 0
PALPITATIONS: 0
NERVOUS/ANXIOUS: 0
WOUND: 0
HEADACHES: 0
COUGH: 0
ARTHRALGIAS: 0
RHINORRHEA: 0
SHORTNESS OF BREATH: 0
WHEEZING: 0
NAUSEA: 0
CONSTIPATION: 0
FEVER: 0
DYSURIA: 0
DIARRHEA: 0
SINUS PRESSURE: 0
FREQUENCY: 0

## 2025-06-09 DIAGNOSIS — Z79.4 TYPE 2 DIABETES MELLITUS WITH OTHER SPECIFIED COMPLICATION, WITH LONG-TERM CURRENT USE OF INSULIN: ICD-10-CM

## 2025-06-09 DIAGNOSIS — E11.69 TYPE 2 DIABETES MELLITUS WITH OTHER SPECIFIED COMPLICATION, WITH LONG-TERM CURRENT USE OF INSULIN: ICD-10-CM

## 2025-06-09 RX ORDER — METFORMIN HYDROCHLORIDE 1000 MG/1
1000 TABLET ORAL
Qty: 60 TABLET | Refills: 0 | OUTPATIENT
Start: 2025-06-09

## 2025-07-01 ENCOUNTER — APPOINTMENT (OUTPATIENT)
Dept: ENDOCRINOLOGY | Facility: CLINIC | Age: 60
End: 2025-07-01
Payer: COMMERCIAL

## 2025-07-01 VITALS
SYSTOLIC BLOOD PRESSURE: 125 MMHG | DIASTOLIC BLOOD PRESSURE: 80 MMHG | BODY MASS INDEX: 29.71 KG/M2 | WEIGHT: 213 LBS | HEART RATE: 77 BPM

## 2025-07-01 DIAGNOSIS — Z79.4 TYPE 2 DIABETES MELLITUS WITHOUT COMPLICATION, WITH LONG-TERM CURRENT USE OF INSULIN: ICD-10-CM

## 2025-07-01 DIAGNOSIS — E11.9 TYPE 2 DIABETES MELLITUS WITHOUT COMPLICATION, WITH LONG-TERM CURRENT USE OF INSULIN: ICD-10-CM

## 2025-07-01 LAB — POC HEMOGLOBIN A1C: 7 % (ref 4.2–6.5)

## 2025-07-01 PROCEDURE — 3051F HG A1C>EQUAL 7.0%<8.0%: CPT | Performed by: INTERNAL MEDICINE

## 2025-07-01 PROCEDURE — 3079F DIAST BP 80-89 MM HG: CPT | Performed by: INTERNAL MEDICINE

## 2025-07-01 PROCEDURE — 3074F SYST BP LT 130 MM HG: CPT | Performed by: INTERNAL MEDICINE

## 2025-07-01 PROCEDURE — 99214 OFFICE O/P EST MOD 30 MIN: CPT | Performed by: INTERNAL MEDICINE

## 2025-07-01 PROCEDURE — 1036F TOBACCO NON-USER: CPT | Performed by: INTERNAL MEDICINE

## 2025-07-01 PROCEDURE — 83036 HEMOGLOBIN GLYCOSYLATED A1C: CPT | Performed by: INTERNAL MEDICINE

## 2025-07-01 RX ORDER — SEMAGLUTIDE 1.34 MG/ML
1 INJECTION, SOLUTION SUBCUTANEOUS
Qty: 3 ML | Refills: 11 | Status: SHIPPED | OUTPATIENT
Start: 2025-07-01 | End: 2026-07-01

## 2025-07-01 ASSESSMENT — ENCOUNTER SYMPTOMS
POLYDIPSIA: 0
ARTHRALGIAS: 1
SHORTNESS OF BREATH: 0
VOMITING: 0
NAUSEA: 0
DIARRHEA: 0
FREQUENCY: 0
APPETITE CHANGE: 0
FATIGUE: 1
HEADACHES: 0
COUGH: 0
MYALGIAS: 1
FEVER: 0
ABDOMINAL PAIN: 0
NERVOUS/ANXIOUS: 0
CONSTIPATION: 0
SORE THROAT: 0

## 2025-07-01 NOTE — PROGRESS NOTES
History Of Present Illness  Kelvin Engle is a 59 y.o. male     Duration of type 2 diabetes mellitus:  17 years  Complications:  none      Weight loss 9 lbs  Continued exercise    Metformin 1000 mg BID  Tresiba 38 units QAM   Novolog 6-8 units before meals  Ozempic 0.5 mg/week    He tried to stop Tresiba without success  Tolerating Ozempic, after initial nausea    FreeStyle Phu 2  Testing glucose 288 times per day.   Patient refuses electronic upload of his device because insurance did not pay for interpretation.   Upgrading to Phu 2 Plus sensors, already provided by [employer].  He is updating kody.    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), Pain in left foot (05/11/2018), Pain in throat (08/15/2015), Personal history of diseases of the skin and subcutaneous tissue (12/19/2018), Personal history of other diseases of the nervous system and sense organs, Personal history of other diseases of the respiratory system (08/06/2014), Personal history of other specified conditions (09/23/2016), Personal history of other specified conditions (09/23/2016), Personal history of other specified conditions (12/07/2014), Personal history of other specified conditions (10/28/2013), Personal history of other specified conditions (05/28/2016), Personal history of other specified conditions, Personal history of urinary (tract) infections (12/19/2013), Plantar fascial fibromatosis (02/23/2018), Procedure and treatment not carried out because of patient's  decision for unspecified reasons (02/08/2018), Procedure and treatment not carried out because of patient's decision for unspecified reasons (03/19/2021), Spermatocele of epididymis, unspecified (07/24/2016), and Unspecified convulsions (Multi).    Surgical History  He has a past surgical history that includes Tonsillectomy (02/08/2018).     Social History  He reports that he has never smoked. He has never used smokeless tobacco. He reports that he does not currently use alcohol. He reports that he does not use drugs.    Family History  Family History[1]    Medications  Current Outpatient Medications   Medication Instructions    insulin aspart (NOVOLOG) 4-12 Units, subcutaneous, 3 times daily before meals    metFORMIN (GLUCOPHAGE) 1,000 mg, oral, Take with food.    Ozempic 0.5 mg, subcutaneous, Every 7 days    Tresiba FlexTouch U-200 42 Units, subcutaneous, Every morning       Allergies  Carbamazepine and Dapagliflozin    Review of Systems   Constitutional:  Positive for fatigue. Negative for appetite change and fever.   HENT:  Negative for sore throat.         Denies dry mouth   Eyes:  Negative for visual disturbance.   Respiratory:  Negative for cough and shortness of breath.    Cardiovascular:  Negative for chest pain.   Gastrointestinal:  Negative for abdominal pain, constipation, diarrhea, nausea and vomiting.   Endocrine: Negative for polydipsia and polyuria.   Genitourinary:  Negative for frequency.   Musculoskeletal:  Positive for arthralgias and myalgias.   Skin:  Negative for rash.   Neurological:  Negative for headaches.   Psychiatric/Behavioral:  The patient is not nervous/anxious.          Last Recorded Vitals  Blood pressure 125/80, pulse 77, weight 96.6 kg (213 lb).    Physical Exam  Constitutional:       General: He is not in acute distress.  HENT:      Head: Normocephalic.      Mouth/Throat:      Mouth: Mucous membranes are moist.   Eyes:      Extraocular Movements: Extraocular movements intact.    Neck:      Thyroid: No thyroid mass or thyromegaly.   Cardiovascular:      Pulses:           Radial pulses are 2+ on the right side and 2+ on the left side.        Dorsalis pedis pulses are 2+ on the right side and 2+ on the left side.   Musculoskeletal:      Right lower leg: No edema.      Left lower leg: No edema.   Feet:      Comments: Flat feet  Lymphadenopathy:      Cervical: No cervical adenopathy.   Skin:     Comments: No foot sores   Neurological:      Mental Status: He is alert.      Motor: No tremor.   Psychiatric:         Mood and Affect: Affect normal.          Relevant Results  Glucose   Date Value   12/09/2023 193 mg/dL (H)   10/22/2022 214 MG/DL (H)   04/14/2021 111 mg/dL (H)   02/22/2020 240 mg/dL (H)     POC HEMOGLOBIN A1c (%)   Date Value   03/18/2025 7.2 (A)   09/03/2024 7.0 (A)   05/28/2024 7.3 (A)     Bicarbonate   Date Value   12/09/2023 28 mmol/L   10/22/2022 28 MMOL/L   04/14/2021 29 mmol/L   02/22/2020 28 mmol/L     Urea Nitrogen   Date Value   12/09/2023 17 mg/dL   10/22/2022 15 MG/DL   04/14/2021 17 mg/dL   02/22/2020 17 mg/dL     Creatinine   Date Value   12/09/2023 0.85 mg/dL   10/22/2022 0.9 MG/DL   04/14/2021 0.88 mg/dL   02/22/2020 0.84 mg/dL     Lab Results   Component Value Date    CHOL 216 (H) 12/09/2023    CHOL 215 (H) 10/22/2022    CHOL 187 04/14/2021     Lab Results   Component Value Date    HDL 44.0 12/09/2023    HDL 48 10/22/2022    HDL 39.3 (A) 04/14/2021     Lab Results   Component Value Date    LDLCALC 150 (H) 12/09/2023    LDLCALC 145 (H) 10/22/2022     Lab Results   Component Value Date    TRIG 111 12/09/2023    TRIG 109 10/22/2022    TRIG 125 04/14/2021     Lab Results   Component Value Date    TSH 2.67 12/09/2023     Lab Results   Component Value Date    ALBUR 12 10/22/2022          IMPRESSION  TYPE 2 DIABETES MELLITUS  LONG TERM CURRENT INSULIN USE  Rapid A1c 7%  Glucose well controlled by A1c  Patient refuses upload/review of his CGM data.  Advised my ability to  make recommendations is limited without that data.   Weight loss on Ozempic  Tolerating Ozempic      RECOMMENDATIONS  Increase Ozempic to 1 mg every 7 days  Decrease Tresiba by 10 units when you increase Ozempic  You may need to decrease Novolog    Follow up 3-4 months  Labs as ordered by Dr. Betancur           [1]   Family History  Problem Relation Name Age of Onset    Diabetes Father      Mental illness Father      Bipolar disorder Father      Mental illness Brother

## 2025-07-01 NOTE — PATIENT INSTRUCTIONS
A1c 7%    RECOMMENDATIONS  Increase Ozempic to 1 mg every 7 days  Decrease Tresiba by 10 units when you increase Ozempic  You may need to decrease Novolog    Follow up 3-4 months  Labs as ordered by Dr. Betancur

## 2025-07-03 ENCOUNTER — TELEPHONE (OUTPATIENT)
Dept: ENDOCRINOLOGY | Facility: CLINIC | Age: 60
End: 2025-07-03
Payer: COMMERCIAL

## 2025-07-03 NOTE — TELEPHONE ENCOUNTER
Patient called and had questions on why his ozempic was sent to  pharmacy, patient had a conversation with a staff member about wanting to try there home delivery, so it was put in a  specialty pharmacy

## 2025-07-15 DIAGNOSIS — Z79.4 TYPE 2 DIABETES MELLITUS WITHOUT COMPLICATION, WITH LONG-TERM CURRENT USE OF INSULIN: ICD-10-CM

## 2025-07-15 DIAGNOSIS — E11.9 TYPE 2 DIABETES MELLITUS WITHOUT COMPLICATION, WITH LONG-TERM CURRENT USE OF INSULIN: ICD-10-CM

## 2025-07-17 ENCOUNTER — TELEPHONE (OUTPATIENT)
Dept: ENDOCRINOLOGY | Facility: CLINIC | Age: 60
End: 2025-07-17
Payer: COMMERCIAL

## 2025-07-17 NOTE — TELEPHONE ENCOUNTER
Called  pharmacy and they are not contracted with patients ins, pharmacy will help patient with further assistance

## 2025-07-23 RX ORDER — SEMAGLUTIDE 1.34 MG/ML
1 INJECTION, SOLUTION SUBCUTANEOUS
Qty: 3 ML | Refills: 11 | Status: SHIPPED | OUTPATIENT
Start: 2025-07-23 | End: 2026-07-23

## 2025-11-12 ENCOUNTER — APPOINTMENT (OUTPATIENT)
Dept: ENDOCRINOLOGY | Facility: CLINIC | Age: 60
End: 2025-11-12
Payer: COMMERCIAL

## 2026-02-05 ENCOUNTER — APPOINTMENT (OUTPATIENT)
Dept: DERMATOLOGY | Facility: CLINIC | Age: 61
End: 2026-02-05
Payer: COMMERCIAL

## 2026-05-08 ENCOUNTER — APPOINTMENT (OUTPATIENT)
Dept: PRIMARY CARE | Facility: CLINIC | Age: 61
End: 2026-05-08
Payer: COMMERCIAL